# Patient Record
Sex: FEMALE | Race: BLACK OR AFRICAN AMERICAN | NOT HISPANIC OR LATINO | Employment: FULL TIME | URBAN - METROPOLITAN AREA
[De-identification: names, ages, dates, MRNs, and addresses within clinical notes are randomized per-mention and may not be internally consistent; named-entity substitution may affect disease eponyms.]

---

## 2018-09-14 ENCOUNTER — HOSPITAL ENCOUNTER (EMERGENCY)
Facility: HOSPITAL | Age: 33
Discharge: HOME | End: 2018-09-14
Attending: EMERGENCY MEDICINE
Payer: COMMERCIAL

## 2018-09-14 VITALS
BODY MASS INDEX: 37.24 KG/M2 | HEART RATE: 68 BPM | TEMPERATURE: 98 F | DIASTOLIC BLOOD PRESSURE: 88 MMHG | HEIGHT: 62 IN | RESPIRATION RATE: 17 BRPM | OXYGEN SATURATION: 98 % | SYSTOLIC BLOOD PRESSURE: 151 MMHG

## 2018-09-14 DIAGNOSIS — R10.9 ACUTE RIGHT FLANK PAIN: ICD-10-CM

## 2018-09-14 DIAGNOSIS — N20.1 URETERAL CALCULUS, RIGHT: Primary | ICD-10-CM

## 2018-09-14 LAB
ALBUMIN SERPL BCP-MCNC: 4.2 G/DL
ALP SERPL-CCNC: 76 U/L
ALT SERPL W/O P-5'-P-CCNC: 24 U/L
ANION GAP SERPL CALC-SCNC: 9 MMOL/L
AST SERPL-CCNC: 28 U/L
B-HCG UR QL: NEGATIVE
BASOPHILS # BLD AUTO: 0.04 K/UL
BASOPHILS NFR BLD: 0.4 %
BILIRUB SERPL-MCNC: 0.3 MG/DL
BILIRUB UR QL STRIP: NEGATIVE
BUN SERPL-MCNC: 15 MG/DL
CALCIUM SERPL-MCNC: 9.5 MG/DL
CHLORIDE SERPL-SCNC: 105 MMOL/L
CLARITY UR REFRACT.AUTO: ABNORMAL
CO2 SERPL-SCNC: 23 MMOL/L
COLOR UR AUTO: YELLOW
CREAT SERPL-MCNC: 1 MG/DL
CTP QC/QA: YES
DIFFERENTIAL METHOD: ABNORMAL
EOSINOPHIL # BLD AUTO: 0 K/UL
EOSINOPHIL NFR BLD: 0.3 %
ERYTHROCYTE [DISTWIDTH] IN BLOOD BY AUTOMATED COUNT: 13.3 %
EST. GFR  (AFRICAN AMERICAN): >60 ML/MIN/1.73 M^2
EST. GFR  (NON AFRICAN AMERICAN): >60 ML/MIN/1.73 M^2
GLUCOSE SERPL-MCNC: 118 MG/DL
GLUCOSE UR QL STRIP: NEGATIVE
HCT VFR BLD AUTO: 36.8 %
HGB BLD-MCNC: 12.7 G/DL
HGB UR QL STRIP: ABNORMAL
IMM GRANULOCYTES # BLD AUTO: 0.02 K/UL
IMM GRANULOCYTES NFR BLD AUTO: 0.2 %
KETONES UR QL STRIP: NEGATIVE
LEUKOCYTE ESTERASE UR QL STRIP: NEGATIVE
LYMPHOCYTES # BLD AUTO: 1.8 K/UL
LYMPHOCYTES NFR BLD: 19.6 %
MCH RBC QN AUTO: 31.7 PG
MCHC RBC AUTO-ENTMCNC: 34.5 G/DL
MCV RBC AUTO: 92 FL
MICROSCOPIC COMMENT: NORMAL
MONOCYTES # BLD AUTO: 0.5 K/UL
MONOCYTES NFR BLD: 5.7 %
NEUTROPHILS # BLD AUTO: 6.6 K/UL
NEUTROPHILS NFR BLD: 73.8 %
NITRITE UR QL STRIP: NEGATIVE
NRBC BLD-RTO: 0 /100 WBC
PH UR STRIP: 7 [PH] (ref 5–8)
PLATELET # BLD AUTO: 302 K/UL
PMV BLD AUTO: 11 FL
POTASSIUM SERPL-SCNC: 4.2 MMOL/L
PROT SERPL-MCNC: 7.6 G/DL
PROT UR QL STRIP: NEGATIVE
RBC # BLD AUTO: 4.01 M/UL
RBC #/AREA URNS AUTO: 2 /HPF (ref 0–4)
SODIUM SERPL-SCNC: 137 MMOL/L
SP GR UR STRIP: 1.02 (ref 1–1.03)
SQUAMOUS #/AREA URNS AUTO: 1 /HPF
URN SPEC COLLECT METH UR: ABNORMAL
UROBILINOGEN UR STRIP-ACNC: NEGATIVE EU/DL
WBC # BLD AUTO: 8.94 K/UL
WBC #/AREA URNS AUTO: 1 /HPF (ref 0–5)

## 2018-09-14 PROCEDURE — 80053 COMPREHEN METABOLIC PANEL: CPT

## 2018-09-14 PROCEDURE — 99284 EMERGENCY DEPT VISIT MOD MDM: CPT | Mod: 25

## 2018-09-14 PROCEDURE — 99283 EMERGENCY DEPT VISIT LOW MDM: CPT | Mod: ,,, | Performed by: EMERGENCY MEDICINE

## 2018-09-14 PROCEDURE — 96376 TX/PRO/DX INJ SAME DRUG ADON: CPT

## 2018-09-14 PROCEDURE — 85025 COMPLETE CBC W/AUTO DIFF WBC: CPT

## 2018-09-14 PROCEDURE — 81001 URINALYSIS AUTO W/SCOPE: CPT

## 2018-09-14 PROCEDURE — 81025 URINE PREGNANCY TEST: CPT | Performed by: EMERGENCY MEDICINE

## 2018-09-14 PROCEDURE — 96374 THER/PROPH/DIAG INJ IV PUSH: CPT

## 2018-09-14 PROCEDURE — 96375 TX/PRO/DX INJ NEW DRUG ADDON: CPT | Mod: 59

## 2018-09-14 PROCEDURE — 63600175 PHARM REV CODE 636 W HCPCS: Performed by: EMERGENCY MEDICINE

## 2018-09-14 PROCEDURE — 25000003 PHARM REV CODE 250: Performed by: EMERGENCY MEDICINE

## 2018-09-14 PROCEDURE — 96361 HYDRATE IV INFUSION ADD-ON: CPT

## 2018-09-14 RX ORDER — HYDROCODONE BITARTRATE AND ACETAMINOPHEN 5; 325 MG/1; MG/1
1 TABLET ORAL EVERY 6 HOURS PRN
Qty: 8 TABLET | Refills: 0 | Status: SHIPPED | OUTPATIENT
Start: 2018-09-14 | End: 2018-11-02

## 2018-09-14 RX ORDER — ONDANSETRON 4 MG/1
4 TABLET, FILM COATED ORAL EVERY 6 HOURS PRN
Qty: 12 TABLET | Refills: 0 | Status: SHIPPED | OUTPATIENT
Start: 2018-09-14 | End: 2019-12-13

## 2018-09-14 RX ORDER — MORPHINE SULFATE 4 MG/ML
4 INJECTION, SOLUTION INTRAMUSCULAR; INTRAVENOUS
Status: COMPLETED | OUTPATIENT
Start: 2018-09-14 | End: 2018-09-14

## 2018-09-14 RX ORDER — ONDANSETRON 2 MG/ML
4 INJECTION INTRAMUSCULAR; INTRAVENOUS
Status: COMPLETED | OUTPATIENT
Start: 2018-09-14 | End: 2018-09-14

## 2018-09-14 RX ORDER — KETOROLAC TROMETHAMINE 30 MG/ML
10 INJECTION, SOLUTION INTRAMUSCULAR; INTRAVENOUS
Status: COMPLETED | OUTPATIENT
Start: 2018-09-14 | End: 2018-09-14

## 2018-09-14 RX ADMIN — SODIUM CHLORIDE, SODIUM LACTATE, POTASSIUM CHLORIDE, AND CALCIUM CHLORIDE 1000 ML: .6; .31; .03; .02 INJECTION, SOLUTION INTRAVENOUS at 04:09

## 2018-09-14 RX ADMIN — MORPHINE SULFATE 4 MG: 4 INJECTION, SOLUTION INTRAMUSCULAR; INTRAVENOUS at 04:09

## 2018-09-14 RX ADMIN — ONDANSETRON 4 MG: 2 INJECTION, SOLUTION INTRAMUSCULAR; INTRAVENOUS at 04:09

## 2018-09-14 RX ADMIN — KETOROLAC TROMETHAMINE 10 MG: 30 INJECTION, SOLUTION INTRAMUSCULAR at 05:09

## 2018-09-14 RX ADMIN — MORPHINE SULFATE 4 MG: 4 INJECTION, SOLUTION INTRAMUSCULAR; INTRAVENOUS at 05:09

## 2018-09-14 NOTE — ED PROVIDER NOTES
Encounter Date: 9/14/2018    SCRIBE #1 NOTE: I, Hilario Leyva, am scribing for, and in the presence of,  Dr. Florez. I have scribed the following portions of the note - the Resident attestation.       History     Chief Complaint   Patient presents with    Flank Pain     pt complains of pain 9/10 radiating to right lower quadrant. pt is aox 4. pt denies chest pain, sob, but complains of nausea. pt denies hx of kidney stones. pt denies blood in urine or fever.      The history is provided by the patient and medical records.   Abdominal Pain   The current episode started 3 to 5 hours ago. The abdominal pain is located in the right flank. The abdominal pain radiates to the RLQ. The severity of the abdominal pain is 6/10. The abdominal pain is relieved by nothing. The other symptoms of the illness include nausea. The other symptoms of the illness do not include fever, vomiting, dysuria or vaginal bleeding.   Additional symptoms associated with the illness include diaphoresis. Symptoms associated with the illness do not include hematuria.      34 yo woman with no medical problems presents with flank pain, right, new, started at midnight, dull, intermittent, 8/10 at worst, 6/10 currently, radiates to RLQ, similar in nature but more severe than a pain experienced several weeks ago, unrelieved by naproxen 400 mg PO at home, associated with diaphoresis and nausea. LMP finished yesterday. Denies fever, vomiting, abdominal pain, dysuria, and hematuria. Denies recent exercise or injury.    Review of patient's allergies indicates:  No Known Allergies  Past Medical History:   Diagnosis Date    Vertigo      Past Surgical History:   Procedure Laterality Date    MYOMECTOMY  09/22/2015    MYOMECTOMY-OPEN N/A 9/22/2015    Performed by Stacie Bond MD at [facility] OR     Family History   Problem Relation Age of Onset    Diabetes Mother     Hypertension Mother     Diabetes Father     Hypertension Father     Prostate cancer  Father     Kidney cancer Father     Breast cancer Neg Hx     Colon cancer Neg Hx     Ovarian cancer Neg Hx     Heart disease Neg Hx      Social History     Tobacco Use    Smoking status: Never Smoker   Substance Use Topics    Alcohol use: No     Alcohol/week: 0.0 oz    Drug use: No     Review of Systems   Constitutional: Positive for diaphoresis. Negative for fever.   Gastrointestinal: Positive for abdominal pain and nausea. Negative for vomiting.   Genitourinary: Positive for flank pain. Negative for dysuria, hematuria and vaginal bleeding.       Physical Exam     Initial Vitals [09/14/18 0347]   BP Pulse Resp Temp SpO2   (!) 193/95 87 20 98.5 °F (36.9 °C) 100 %      MAP       --         Physical Exam    Nursing note and vitals reviewed.  Constitutional: She appears well-developed and well-nourished. She is diaphoretic. No distress.   HENT:   Head: Normocephalic and atraumatic.   Right Ear: External ear normal.   Left Ear: External ear normal.   Nose: Nose normal.   Mouth/Throat: Oropharynx is clear and moist.   Eyes: Conjunctivae are normal. Pupils are equal, round, and reactive to light. Right eye exhibits no discharge. Left eye exhibits no discharge. No scleral icterus.   Neck: Neck supple. No thyromegaly present. No tracheal deviation present. No JVD present.   Cardiovascular: Normal rate, regular rhythm, normal heart sounds and intact distal pulses. Exam reveals no gallop and no friction rub.    No murmur heard.  Pulmonary/Chest: Breath sounds normal. No stridor. No respiratory distress. She has no wheezes. She has no rhonchi. She has no rales. She exhibits no tenderness.   Abdominal: Soft. Bowel sounds are normal. She exhibits no distension. There is no tenderness. There is no rigidity, no rebound, no guarding, no CVA tenderness, no tenderness at McBurney's point and negative De Luna's sign.   Musculoskeletal: Normal range of motion. She exhibits no edema.   Lymphadenopathy:     She has no cervical  adenopathy.   Neurological: She is alert and oriented to person, place, and time.   Skin: Skin is warm.   Psychiatric: She has a normal mood and affect. Her behavior is normal.         ED Course   Procedures  Labs Reviewed   CBC W/ AUTO DIFFERENTIAL   COMPREHENSIVE METABOLIC PANEL   URINALYSIS, REFLEX TO URINE CULTURE   POCT URINE PREGNANCY          Imaging Results    None          Medical Decision Making:   History:   Old Medical Records: I decided to obtain old medical records.  Clinical Tests:   Lab Tests: Ordered and Reviewed  Radiological Study: Ordered and Reviewed       APC / Resident Notes:   HO-IV MDM  Ddx: nephrolithiasis, pyelonephritis, muscle strain, hydronephrosis; considered cholecystitis and appendicitis but no abdominal tenderness.    Flank pain work-up including CT renal stone study. 1 L IVF, Zofran 4 mg IV, and morphine 4 mg IV.    Jean Carlos Eaton, PGY4 4:16 AM 09/14/2018       CT renal stone:  1. Mild right hydroureteronephrosis with surrounding perinephric stranding.  Calcification within the right pelvis near the expected course of the ureter measuring up to 0.5 cm likely represent distal ureteral stone.  2. Intrauterine device in place.    UA without signs of infection, CBC and CMP unremarkable with normal creatinine.    Upon reassessment, patient's pain has improved. Discharge with [facility], Zofran, urology, and PCP f/u with return precautions given. Patient has naproxen at home.    Jean Carlos Eaton, PGY4 5:38 AM 09/14/2018      Scribe Attestation:   Scribe #1: I performed the above scribed service and the documentation accurately describes the services I performed. I attest to the accuracy of the note.    Attending Attestation:   Physician Attestation Statement for Resident:  As the supervising MD   Physician Attestation Statement: I have personally seen and examined this patient.   I agree with the above history. -:   As the supervising MD I agree with the above PE.    As the supervising  MD I agree with the above treatment, course, plan, and disposition.  I have reviewed and agree with the residents interpretation of the following: lab data and CT scans.                       Clinical Impression:   The encounter diagnosis was Acute right flank pain.      Disposition:   Disposition: Discharged  Condition: Stable                        Jean Carlos Eaton MD  Resident  09/14/18 0538

## 2018-09-14 NOTE — ED NOTES
Kady Arredondo Vishnu, an 33 y.o. female presents to the ED c/o right flank pain that radiates to her groin . Denies heavy lifting and urinary s/s. C/o nausea.       Chief Complaint   Patient presents with    Flank Pain     pt complains of pain 9/10 radiating to right lower quadrant. pt is aox 4. pt denies chest pain, sob, but complains of nausea. pt denies hx of kidney stones. pt denies blood in urine or fever.      Review of patient's allergies indicates:  No Known Allergies  Past Medical History:   Diagnosis Date    Vertigo

## 2018-11-02 ENCOUNTER — LAB VISIT (OUTPATIENT)
Dept: LAB | Facility: HOSPITAL | Age: 33
End: 2018-11-02
Attending: INTERNAL MEDICINE
Payer: COMMERCIAL

## 2018-11-02 ENCOUNTER — TELEPHONE (OUTPATIENT)
Dept: OBSTETRICS AND GYNECOLOGY | Facility: CLINIC | Age: 33
End: 2018-11-02

## 2018-11-02 ENCOUNTER — OFFICE VISIT (OUTPATIENT)
Dept: INTERNAL MEDICINE | Facility: CLINIC | Age: 33
End: 2018-11-02
Payer: COMMERCIAL

## 2018-11-02 VITALS
RESPIRATION RATE: 14 BRPM | SYSTOLIC BLOOD PRESSURE: 118 MMHG | DIASTOLIC BLOOD PRESSURE: 74 MMHG | TEMPERATURE: 98 F | HEART RATE: 84 BPM | WEIGHT: 212.94 LBS | BODY MASS INDEX: 38.95 KG/M2

## 2018-11-02 DIAGNOSIS — Z00.00 ANNUAL PHYSICAL EXAM: Primary | ICD-10-CM

## 2018-11-02 DIAGNOSIS — E55.9 VITAMIN D DEFICIENCY: Primary | ICD-10-CM

## 2018-11-02 DIAGNOSIS — F32.A MILD DEPRESSION: ICD-10-CM

## 2018-11-02 DIAGNOSIS — Z86.2 HISTORY OF ANEMIA: ICD-10-CM

## 2018-11-02 DIAGNOSIS — Z00.00 ANNUAL PHYSICAL EXAM: ICD-10-CM

## 2018-11-02 DIAGNOSIS — E66.9 OBESITY (BMI 35.0-39.9 WITHOUT COMORBIDITY): ICD-10-CM

## 2018-11-02 DIAGNOSIS — F41.9 ANXIETY: ICD-10-CM

## 2018-11-02 PROBLEM — N20.0 KIDNEY STONE: Status: ACTIVE | Noted: 2018-09-14

## 2018-11-02 LAB
25(OH)D3+25(OH)D2 SERPL-MCNC: 6 NG/ML
ALBUMIN SERPL BCP-MCNC: 3.9 G/DL
ALP SERPL-CCNC: 70 U/L
ALT SERPL W/O P-5'-P-CCNC: 21 U/L
ANION GAP SERPL CALC-SCNC: 7 MMOL/L
AST SERPL-CCNC: 20 U/L
BASOPHILS # BLD AUTO: 0.05 K/UL
BASOPHILS NFR BLD: 0.8 %
BILIRUB SERPL-MCNC: 0.3 MG/DL
BUN SERPL-MCNC: 10 MG/DL
CALCIUM SERPL-MCNC: 9.9 MG/DL
CHLORIDE SERPL-SCNC: 104 MMOL/L
CHOLEST SERPL-MCNC: 197 MG/DL
CHOLEST/HDLC SERPL: 3.7 {RATIO}
CO2 SERPL-SCNC: 27 MMOL/L
CREAT SERPL-MCNC: 0.8 MG/DL
DIFFERENTIAL METHOD: NORMAL
EOSINOPHIL # BLD AUTO: 0.1 K/UL
EOSINOPHIL NFR BLD: 0.9 %
ERYTHROCYTE [DISTWIDTH] IN BLOOD BY AUTOMATED COUNT: 13.3 %
EST. GFR  (AFRICAN AMERICAN): >60 ML/MIN/1.73 M^2
EST. GFR  (NON AFRICAN AMERICAN): >60 ML/MIN/1.73 M^2
ESTIMATED AVG GLUCOSE: 111 MG/DL
FERRITIN SERPL-MCNC: 30 NG/ML
FOLATE SERPL-MCNC: 13.3 NG/ML
GLUCOSE SERPL-MCNC: 98 MG/DL
HBA1C MFR BLD HPLC: 5.5 %
HCT VFR BLD AUTO: 37.9 %
HDLC SERPL-MCNC: 53 MG/DL
HDLC SERPL: 26.9 %
HGB BLD-MCNC: 12.4 G/DL
IMM GRANULOCYTES # BLD AUTO: 0.02 K/UL
IMM GRANULOCYTES NFR BLD AUTO: 0.3 %
IRON SERPL-MCNC: 50 UG/DL
LDLC SERPL CALC-MCNC: 128.4 MG/DL
LYMPHOCYTES # BLD AUTO: 2.5 K/UL
LYMPHOCYTES NFR BLD: 38.5 %
MCH RBC QN AUTO: 30.4 PG
MCHC RBC AUTO-ENTMCNC: 32.7 G/DL
MCV RBC AUTO: 93 FL
MONOCYTES # BLD AUTO: 0.4 K/UL
MONOCYTES NFR BLD: 5.7 %
NEUTROPHILS # BLD AUTO: 3.5 K/UL
NEUTROPHILS NFR BLD: 53.8 %
NONHDLC SERPL-MCNC: 144 MG/DL
NRBC BLD-RTO: 0 /100 WBC
PLATELET # BLD AUTO: 319 K/UL
PMV BLD AUTO: 11.1 FL
POTASSIUM SERPL-SCNC: 4 MMOL/L
PROT SERPL-MCNC: 7.5 G/DL
RBC # BLD AUTO: 4.08 M/UL
SATURATED IRON: 13 %
SODIUM SERPL-SCNC: 138 MMOL/L
TOTAL IRON BINDING CAPACITY: 395 UG/DL
TRANSFERRIN SERPL-MCNC: 267 MG/DL
TRIGL SERPL-MCNC: 78 MG/DL
TSH SERPL DL<=0.005 MIU/L-ACNC: 0.95 UIU/ML
VIT B12 SERPL-MCNC: 1134 PG/ML
WBC # BLD AUTO: 6.5 K/UL

## 2018-11-02 PROCEDURE — 84443 ASSAY THYROID STIM HORMONE: CPT

## 2018-11-02 PROCEDURE — 82306 VITAMIN D 25 HYDROXY: CPT

## 2018-11-02 PROCEDURE — 83036 HEMOGLOBIN GLYCOSYLATED A1C: CPT

## 2018-11-02 PROCEDURE — 99395 PREV VISIT EST AGE 18-39: CPT | Mod: S$GLB,,, | Performed by: INTERNAL MEDICINE

## 2018-11-02 PROCEDURE — 82728 ASSAY OF FERRITIN: CPT

## 2018-11-02 PROCEDURE — 80061 LIPID PANEL: CPT

## 2018-11-02 PROCEDURE — 85025 COMPLETE CBC W/AUTO DIFF WBC: CPT

## 2018-11-02 PROCEDURE — 82746 ASSAY OF FOLIC ACID SERUM: CPT

## 2018-11-02 PROCEDURE — 80053 COMPREHEN METABOLIC PANEL: CPT

## 2018-11-02 PROCEDURE — 82607 VITAMIN B-12: CPT

## 2018-11-02 PROCEDURE — 99999 PR PBB SHADOW E&M-EST. PATIENT-LVL III: CPT | Mod: PBBFAC,,, | Performed by: INTERNAL MEDICINE

## 2018-11-02 PROCEDURE — 36415 COLL VENOUS BLD VENIPUNCTURE: CPT | Mod: PO

## 2018-11-02 PROCEDURE — 83540 ASSAY OF IRON: CPT

## 2018-11-02 RX ORDER — ERGOCALCIFEROL 1.25 MG/1
50000 CAPSULE ORAL
Qty: 4 CAPSULE | Refills: 2 | Status: SHIPPED | OUTPATIENT
Start: 2018-11-02 | End: 2019-01-25 | Stop reason: ALTCHOICE

## 2018-11-02 NOTE — PROGRESS NOTES
Subjective:      Kady Mares is a 33 y.o. female who presents for annual exam.    Family History:  family history includes Diabetes in her father and mother; Hypertension in her father and mother; Kidney cancer in her father; Prostate cancer in her father.    Health Maintenance:  Health Maintenance       Date Due Completion Date    TETANUS VACCINE 02/28/2003 ---    Pap Smear with HPV Cotest 07/30/2018 7/30/2015    Influenza Vaccine 08/01/2018 ---        Eye exam: not recently  Dental Exam: sees yearly  OB/GYN: Dr. Bond    Last Pap: 9/2015    Influenza: will receive @ work, [employer]  Tetanus: in last 10 years    Exercise: minimal  Diet: previously vegetarian, now off and eats chicken and fish, some meat substitutes  Body mass index is 38.95 kg/m².    Meds:   Current Outpatient Medications:     meclizine (ANTIVERT) 50 MG tablet, Take 50 mg by mouth as needed. , Disp: , Rfl:     ondansetron (ZOFRAN) 4 MG tablet, Take 1 tablet (4 mg total) by mouth every 6 (six) hours as needed for Nausea., Disp: 12 tablet, Rfl: 0    PMHx:   Past Medical History:   Diagnosis Date    Nephrolithiasis     Vertigo        PSHx:     Past Surgical History:   Procedure Laterality Date    MYOMECTOMY  09/22/2015    MYOMECTOMY-OPEN N/A 9/22/2015    [facility] at [facility]       [facility]   Social History     Socioeconomic History    Marital status:      Spouse name: None    Number of children: None    Years of education: None    Highest education level: None   Social Needs    Financial resource strain: None    Food insecurity - worry: None    Food insecurity - inability: None    Transportation needs - medical: None    Transportation needs - non-medical: None   Occupational History    None   Tobacco Use    Smoking status: Never Smoker   Substance and Sexual Activity    Alcohol use: No     Alcohol/week: 0.0 oz    Drug use: No    Sexual activity: Not Currently   Other Topics Concern     None   Social History Narrative    None       Review of Systems   Constitutional: Positive for unexpected weight change. Negative for activity change, chills, diaphoresis and fever.   HENT: Negative for congestion, ear discharge, ear pain, hearing loss, rhinorrhea, sinus pressure and trouble swallowing.    Eyes: Negative for discharge, itching and visual disturbance.   Respiratory: Negative for cough, chest tightness, shortness of breath and wheezing.    Cardiovascular: Positive for chest pain (associated with anxiety with racing heart, possibly stress-related). Negative for palpitations and leg swelling.   Gastrointestinal: Positive for vomiting (in association with vertigo). Negative for abdominal pain, blood in stool, constipation and diarrhea.   Endocrine: Positive for heat intolerance. Negative for cold intolerance, polydipsia and polyuria.   Genitourinary: Negative for difficulty urinating, dysuria, frequency, hematuria, menstrual problem and urgency.   Musculoskeletal: Positive for arthralgias (wrists and old ankle sprain). Negative for joint swelling and neck pain.   Skin: Negative for rash.   Neurological: Positive for headaches. Negative for dizziness, weakness and numbness.   Hematological: Negative for adenopathy.   Psychiatric/Behavioral: Positive for dysphoric mood. Negative for confusion. The patient is nervous/anxious.          Answers for HPI/ROS submitted by the patient on 10/30/2018   activity change: No  unexpected weight change: Yes  neck pain: No  hearing loss: No  rhinorrhea: No  trouble swallowing: No  eye discharge: No  visual disturbance: No  chest tightness: No  wheezing: No  chest pain: Yes  palpitations: No  blood in stool: No  constipation: No  vomiting: Yes  diarrhea: No  polydipsia: No  polyuria: No  difficulty urinating: No  hematuria: No  menstrual problem: No  dysuria: No  joint swelling: No  arthralgias: Yes  headaches: Yes  weakness: No  confusion: No  dysphoric mood:  Yes        Objective:      Physical Exam   Constitutional: She is oriented to person, place, and time. Vital signs are normal. She appears well-developed and well-nourished. No distress.   HENT:   Head: Normocephalic and atraumatic.   Right Ear: Hearing, tympanic membrane, external ear and ear canal normal. Tympanic membrane is not erythematous and not bulging.   Left Ear: Hearing, tympanic membrane, external ear and ear canal normal. Tympanic membrane is not erythematous and not bulging.   Nose: Nose normal.   Mouth/Throat: Uvula is midline, oropharynx is clear and moist and mucous membranes are normal. No oropharyngeal exudate or posterior oropharyngeal erythema.   Eyes: Conjunctivae, EOM and lids are normal. Pupils are equal, round, and reactive to light. No scleral icterus.   Neck: Normal range of motion. Neck supple. No thyroid mass and no thyromegaly present.   Cardiovascular: Normal rate, regular rhythm, normal heart sounds and intact distal pulses.   No murmur heard.  Pulmonary/Chest: Effort normal and breath sounds normal. She has no wheezes.   Abdominal: Soft. Bowel sounds are normal. She exhibits no distension. There is no hepatosplenomegaly. There is no tenderness. There is no rigidity, no rebound and no guarding.   Musculoskeletal: Normal range of motion. She exhibits no edema.   Lymphadenopathy:     She has no cervical adenopathy.        Right: No supraclavicular adenopathy present.        Left: No supraclavicular adenopathy present.   Neurological: She is alert and oriented to person, place, and time. She has normal reflexes. Coordination and gait normal.   Skin: Skin is warm, dry and intact. No rash noted. She is not diaphoretic.   Psychiatric: She has a normal mood and affect.   Vitals reviewed.      Assessment:       1. Annual physical exam    2. Anxiety    3. Mild depression    4. Obesity (BMI 35.0-39.9 without comorbidity)    5. History of anemia        Plan:     1. Annual physical exam  -  Ambulatory referral to Obstetrics / Gynecology  - CBC auto differential; Future  - Comprehensive metabolic panel; Future  - Hemoglobin A1c; Future  - Lipid panel; Future  - TSH; Future  - Urinalysis; Future  - Vitamin D; Future    2. Anxiety  - mild, symptoms present  - patient plans to re-establish care with Psychology    3. Mild depression  - discussed trial of treatment with patient and she will consider    4. Obesity (BMI 35.0-39.9 without comorbidity)  - plans to increase physical activity    5. History of anemia  - Ferritin; Future  - Iron and TIBC; Future  - Folate; Future  - Vitamin B12; Future    RTC in 1 year or sooner if needed    Diamond Vasquez MD

## 2018-11-02 NOTE — TELEPHONE ENCOUNTER
----- Message from Melissa Gallegos sent at 11/2/2018  8:46 AM CDT -----  Contact: Pt [phone]  Pt would like a call back from the nurse to see if she can be fit in for her annual. Pt is added to the wait list schedule for Dr Bond seems to not be open pt would like to know when it would be open. Please advise pt.   She states she can come any Thursday afternoon or any Fridays

## 2018-11-02 NOTE — TELEPHONE ENCOUNTER
----- Message from Saranya Hamm sent at 11/2/2018 10:48 AM CDT -----  Contact: Pt   Name of Who is Calling: PILAR SEYMOUR [[id number]]    What is the request in detail: Pt is returning Meghna's call, please advise.    Can the clinic reply by [facility]: No     [facility] [phone]

## 2018-11-02 NOTE — TELEPHONE ENCOUNTER
Hello, this is Meghna calling from the OBGYN clinic at [facility]. Calling to offer the pt an appointment on 12/07 at 1PM for her WWE. (No answer, left VM message for this pt regarding this information and to call the clinic back to confirm.)

## 2018-11-04 ENCOUNTER — PATIENT MESSAGE (OUTPATIENT)
Dept: INTERNAL MEDICINE | Facility: CLINIC | Age: 33
End: 2018-11-04

## 2018-11-05 NOTE — TELEPHONE ENCOUNTER
Lexapro tends to be well-tolerated. Will start Lexapro 5mg daily. May increase dose if no improvement in 3 weeks.    Emailed patient, will enter Lexapro after she is notified.

## 2018-11-06 RX ORDER — ESCITALOPRAM OXALATE 5 MG/1
5 TABLET ORAL DAILY
Qty: 30 TABLET | Refills: 0 | Status: SHIPPED | OUTPATIENT
Start: 2018-11-06 | End: 2018-12-31 | Stop reason: SDUPTHER

## 2018-12-07 ENCOUNTER — OFFICE VISIT (OUTPATIENT)
Dept: OBSTETRICS AND GYNECOLOGY | Facility: CLINIC | Age: 33
End: 2018-12-07
Attending: OBSTETRICS & GYNECOLOGY
Payer: COMMERCIAL

## 2018-12-07 VITALS
WEIGHT: 212.31 LBS | DIASTOLIC BLOOD PRESSURE: 90 MMHG | SYSTOLIC BLOOD PRESSURE: 144 MMHG | BODY MASS INDEX: 39.07 KG/M2 | HEIGHT: 62 IN

## 2018-12-07 DIAGNOSIS — Z01.419 VISIT FOR GYNECOLOGIC EXAMINATION: Primary | ICD-10-CM

## 2018-12-07 PROCEDURE — 88175 CYTOPATH C/V AUTO FLUID REDO: CPT | Performed by: PATHOLOGY

## 2018-12-07 PROCEDURE — 99999 PR PBB SHADOW E&M-EST. PATIENT-LVL III: CPT | Mod: PBBFAC,,, | Performed by: OBSTETRICS & GYNECOLOGY

## 2018-12-07 PROCEDURE — 87624 HPV HI-RISK TYP POOLED RSLT: CPT

## 2018-12-07 PROCEDURE — 99385 PREV VISIT NEW AGE 18-39: CPT | Mod: S$GLB,,, | Performed by: OBSTETRICS & GYNECOLOGY

## 2018-12-07 PROCEDURE — 88141 CYTOPATH C/V INTERPRET: CPT | Mod: ,,, | Performed by: PATHOLOGY

## 2018-12-07 NOTE — PROGRESS NOTES
"CC: Well woman exam    Kady Mares is a 33 y.o. female  presents for a well woman exam.  She has no issues, problems, or complaints.    Past Medical History:   Diagnosis Date    Nephrolithiasis     Vertigo        Past Surgical History:   Procedure Laterality Date    MYOMECTOMY  2015    MYOMECTOMY-OPEN N/A 2015    Performed by Stacie Bond MD at [facility] OR       OB History    Para Term  AB Living   0 0 0 0 0 0   SAB TAB Ectopic Multiple Live Births   0 0 0 0               Family History   Problem Relation Age of Onset    Diabetes Mother     Hypertension Mother     Diabetes Father     Hypertension Father     Prostate cancer Father     Kidney cancer Father     Breast cancer Neg Hx     Colon cancer Neg Hx     Ovarian cancer Neg Hx     Heart disease Neg Hx        Social History     Tobacco Use    Smoking status: Never Smoker    Smokeless tobacco: Never Used   Substance Use Topics    Alcohol use: No     Alcohol/week: 0.0 oz    Drug use: No       BP (!) 144/90 (BP Location: Left arm, Patient Position: Sitting, BP Method: Small (Manual))   Ht 5' 2" (1.575 m)   Wt 96.3 kg (212 lb 4.9 oz)   LMP 2018 (Exact Date)   BMI 38.83 kg/m²     ROS:  GENERAL: Denies weight gain or weight loss. Feeling well overall.   SKIN: Denies rash or lesions.   HEAD: Denies head injury or headache.   NODES: Denies enlarged lymph nodes.   CHEST: Denies chest pain or shortness of breath.   CARDIOVASCULAR: Denies palpitations or left sided chest pain.   ABDOMEN: No abdominal pain, constipation, diarrhea, nausea, vomiting or rectal bleeding.   URINARY: No frequency, dysuria, hematuria, or burning on urination.  REPRODUCTIVE: See HPI.   BREASTS: The patient performs breast self-examination and denies pain, lumps, or nipple discharge.   HEMATOLOGIC: No easy bruisability or excessive bleeding.  MUSCULOSKELETAL: Denies joint pain or swelling.   NEUROLOGIC: Denies syncope or " weakness.   PSYCHIATRIC: Denies depression, anxiety or mood swings.    Physical Exam:    APPEARANCE: Well nourished, well developed, in no acute distress.  AFFECT: WNL, alert and oriented x 3  SKIN: No acne or hirsutism  NECK: Neck symmetric without masses or thyromegaly  NODES: No inguinal, cervical, axillary, or femoral lymph node enlargement  CHEST: Good respiratory effect  ABDOMEN: Soft.  No tenderness or masses.  No hepatosplenomegaly.  No hernias.  BREASTS: Symmetrical, no skin changes or visible lesions.  No palpable masses, nipple discharge bilaterally.  PELVIC: Normal external genitalia without lesions.  Normal hair distribution.  Adequate perineal body, normal urethral meatus.  Vagina moist and well rugated without lesions or discharge.  Cervix pink, without lesions, discharge or tenderness.  IUD strings are visible and palpable.  No significant cystocele or rectocele.  Bimanual exam shows uterus to be normal size, regular, mobile and nontender.  Adnexa without masses or tenderness.    EXTREMITIES: No edema.    ASSESSMENT AND PLAN  1. Visit for gynecologic examination  Liquid-based pap smear, screening    HPV High Risk Genotypes, PCR       Patient was counseled today on A.C.S. Pap guidelines and recommendations for yearly pelvic exams, pap smears every 5 years with HPV co-testing, and monthly self breast exams; to see her PCP for other health maintenance.     Follow-up in about 1 year (around 12/7/2019).

## 2018-12-13 LAB
HPV HR 12 DNA CVX QL NAA+PROBE: NEGATIVE
HPV16 AG SPEC QL: NEGATIVE
HPV18 DNA SPEC QL NAA+PROBE: NEGATIVE

## 2018-12-16 ENCOUNTER — PATIENT MESSAGE (OUTPATIENT)
Dept: INTERNAL MEDICINE | Facility: CLINIC | Age: 33
End: 2018-12-16

## 2018-12-31 ENCOUNTER — PATIENT MESSAGE (OUTPATIENT)
Dept: INTERNAL MEDICINE | Facility: CLINIC | Age: 33
End: 2018-12-31

## 2018-12-31 RX ORDER — ESCITALOPRAM OXALATE 10 MG/1
10 TABLET ORAL DAILY
Qty: 30 TABLET | Refills: 0 | Status: SHIPPED | OUTPATIENT
Start: 2018-12-31 | End: 2019-01-25 | Stop reason: SDUPTHER

## 2018-12-31 NOTE — TELEPHONE ENCOUNTER
Will increase Lexapro to 10mg daily.     And Lexapro can be associated with muscle spasms and twitching but it is rare. Monitor for worsening eye twitching.

## 2019-01-02 ENCOUNTER — PATIENT MESSAGE (OUTPATIENT)
Dept: INTERNAL MEDICINE | Facility: CLINIC | Age: 34
End: 2019-01-02

## 2019-01-14 ENCOUNTER — PATIENT MESSAGE (OUTPATIENT)
Dept: INTERNAL MEDICINE | Facility: CLINIC | Age: 34
End: 2019-01-14

## 2019-01-14 NOTE — TELEPHONE ENCOUNTER
Pt informed via portal that a follow up appt is necessary and to make sure she brings all paperwork and forms with her at time of appointment.

## 2019-01-14 NOTE — TELEPHONE ENCOUNTER
Patient seen once 11/2/18.     Will need a follow-up visit for anxiety/depression. She would need to bring any details or forms she may need to be completed.

## 2019-01-20 ENCOUNTER — PATIENT MESSAGE (OUTPATIENT)
Dept: INTERNAL MEDICINE | Facility: CLINIC | Age: 34
End: 2019-01-20

## 2019-01-25 ENCOUNTER — OFFICE VISIT (OUTPATIENT)
Dept: INTERNAL MEDICINE | Facility: CLINIC | Age: 34
End: 2019-01-25
Payer: COMMERCIAL

## 2019-01-25 VITALS
WEIGHT: 211.88 LBS | TEMPERATURE: 99 F | DIASTOLIC BLOOD PRESSURE: 86 MMHG | BODY MASS INDEX: 38.99 KG/M2 | SYSTOLIC BLOOD PRESSURE: 128 MMHG | HEIGHT: 62 IN | RESPIRATION RATE: 16 BRPM | HEART RATE: 68 BPM

## 2019-01-25 DIAGNOSIS — F41.9 ANXIETY AND DEPRESSION: Primary | ICD-10-CM

## 2019-01-25 DIAGNOSIS — F32.A ANXIETY AND DEPRESSION: Primary | ICD-10-CM

## 2019-01-25 PROCEDURE — 3008F PR BODY MASS INDEX (BMI) DOCUMENTED: ICD-10-PCS | Mod: CPTII,S$GLB,, | Performed by: INTERNAL MEDICINE

## 2019-01-25 PROCEDURE — 99999 PR PBB SHADOW E&M-EST. PATIENT-LVL III: CPT | Mod: PBBFAC,,, | Performed by: INTERNAL MEDICINE

## 2019-01-25 PROCEDURE — 3008F BODY MASS INDEX DOCD: CPT | Mod: CPTII,S$GLB,, | Performed by: INTERNAL MEDICINE

## 2019-01-25 PROCEDURE — 99213 OFFICE O/P EST LOW 20 MIN: CPT | Mod: S$GLB,,, | Performed by: INTERNAL MEDICINE

## 2019-01-25 PROCEDURE — 99999 PR PBB SHADOW E&M-EST. PATIENT-LVL III: ICD-10-PCS | Mod: PBBFAC,,, | Performed by: INTERNAL MEDICINE

## 2019-01-25 PROCEDURE — 99213 PR OFFICE/OUTPT VISIT, EST, LEVL III, 20-29 MIN: ICD-10-PCS | Mod: S$GLB,,, | Performed by: INTERNAL MEDICINE

## 2019-01-25 RX ORDER — ESCITALOPRAM OXALATE 20 MG/1
20 TABLET ORAL DAILY
Qty: 30 TABLET | Refills: 0 | Status: SHIPPED | OUTPATIENT
Start: 2019-01-25 | End: 2019-02-25 | Stop reason: SDUPTHER

## 2019-01-25 NOTE — PROGRESS NOTES
Subjective:       Patient ID: Kady Mares is a 33 y.o. female who presents for Follow-up (fill out forms) and Anxiety      Anxiety   Presents for follow-up visit. Symptoms include depressed mood, dizziness (improved after getting adjusted to Lexapro), excessive worry, irritability, nausea (due to Lexapro, improving), nervous/anxious behavior and restlessness. Patient reports no chest pain, confusion, hyperventilation, palpitations or shortness of breath. Symptoms occur constantly. The severity of symptoms is moderate. The quality of sleep is fair.     Compliance with medications is %. Side effects of treatment include GI discomfort.      Patient was evaluated by Araseli Dunn [facility] for anxiety and depression and she will see the counselor every 2 weeks. 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       Review of Systems   Constitutional: Positive for irritability. Negative for activity change, chills, fever and unexpected weight change.   HENT: Negative for congestion, hearing loss, postnasal drip, rhinorrhea, sore throat and trouble swallowing.    Eyes: Negative for discharge, redness and visual disturbance.   Respiratory: Negative for cough, chest tightness, shortness of breath and wheezing.    Cardiovascular: Negative for chest pain and palpitations.   Gastrointestinal: Positive for nausea (due to Lexapro, improving). Negative for abdominal pain, blood in stool, constipation, diarrhea and vomiting.   Endocrine: Negative for polydipsia and polyuria.   Genitourinary: Negative for difficulty urinating, dysuria, hematuria and menstrual problem.   Musculoskeletal: Negative for arthralgias, joint swelling and neck pain.   Skin: Negative for rash.   Neurological: Positive for dizziness (improved after getting adjusted to Lexapro). Negative for weakness and headaches.   Psychiatric/Behavioral: Positive for  dysphoric mood. Negative for confusion. The patient is nervous/anxious.          Answers for HPI/ROS submitted by the patient on 1/24/2019   activity change: No  unexpected weight change: No  neck pain: No  hearing loss: No  rhinorrhea: No  trouble swallowing: No  eye discharge: No  visual disturbance: No  chest tightness: No  wheezing: No  chest pain: Yes  palpitations: No  blood in stool: No  constipation: No  vomiting: No  diarrhea: No  polydipsia: No  polyuria: No  difficulty urinating: No  hematuria: No  menstrual problem: No  dysuria: No  joint swelling: No  arthralgias: No  headaches: No  weakness: No  confusion: No  dysphoric mood: Yes      Objective:      Physical Exam   Constitutional: She is oriented to person, place, and time. Vital signs are normal. She appears well-developed and well-nourished. No distress.   HENT:   Head: Normocephalic and atraumatic.   Right Ear: Hearing and external ear normal.   Left Ear: Hearing and external ear normal.   Nose: Nose normal.   Mouth/Throat: Uvula is midline.   Eyes: Lids are normal.   Cardiovascular: Normal rate, regular rhythm, normal heart sounds and intact distal pulses.   No murmur heard.  Pulmonary/Chest: Effort normal and breath sounds normal. She has no wheezes.   Abdominal: Soft. Bowel sounds are normal. She exhibits no distension. There is no tenderness.   Musculoskeletal: Normal range of motion. She exhibits no edema.   Neurological: She is alert and oriented to person, place, and time.   Skin: Skin is warm, dry and intact. No rash noted. She is not diaphoretic.   Psychiatric: She has a normal mood and affect.   Vitals reviewed.      Assessment/Plan:         1. Anxiety and depression  - increase Lexapro   - escitalopram oxalate (LEXAPRO) 20 MG tablet; Take 1 tablet (20 mg total) by mouth once daily.  Dispense: 30 tablet; Refill: 0  - continue working with therapist  - will complete letter for special testing accommodations    RTC in 3 months or sooner if  needed    Diamond Vasquez MD

## 2019-01-28 ENCOUNTER — TELEPHONE (OUTPATIENT)
Dept: INTERNAL MEDICINE | Facility: CLINIC | Age: 34
End: 2019-01-28

## 2019-02-25 DIAGNOSIS — F41.9 ANXIETY AND DEPRESSION: ICD-10-CM

## 2019-02-25 DIAGNOSIS — F32.A ANXIETY AND DEPRESSION: ICD-10-CM

## 2019-02-25 RX ORDER — ESCITALOPRAM OXALATE 20 MG/1
20 TABLET ORAL DAILY
Qty: 30 TABLET | Refills: 2 | Status: SHIPPED | OUTPATIENT
Start: 2019-02-25 | End: 2019-05-29 | Stop reason: SDUPTHER

## 2019-05-29 DIAGNOSIS — F32.A ANXIETY AND DEPRESSION: ICD-10-CM

## 2019-05-29 DIAGNOSIS — F41.9 ANXIETY AND DEPRESSION: ICD-10-CM

## 2019-05-29 RX ORDER — ESCITALOPRAM OXALATE 20 MG/1
20 TABLET ORAL DAILY
Qty: 30 TABLET | Refills: 2 | Status: SHIPPED | OUTPATIENT
Start: 2019-05-29 | End: 2019-12-13

## 2019-12-13 ENCOUNTER — LAB VISIT (OUTPATIENT)
Dept: LAB | Facility: HOSPITAL | Age: 34
End: 2019-12-13
Attending: INTERNAL MEDICINE
Payer: COMMERCIAL

## 2019-12-13 ENCOUNTER — OFFICE VISIT (OUTPATIENT)
Dept: INTERNAL MEDICINE | Facility: CLINIC | Age: 34
End: 2019-12-13
Payer: COMMERCIAL

## 2019-12-13 VITALS
BODY MASS INDEX: 41.26 KG/M2 | HEART RATE: 78 BPM | TEMPERATURE: 99 F | HEIGHT: 62 IN | SYSTOLIC BLOOD PRESSURE: 132 MMHG | RESPIRATION RATE: 20 BRPM | WEIGHT: 224.19 LBS | DIASTOLIC BLOOD PRESSURE: 80 MMHG

## 2019-12-13 DIAGNOSIS — E66.01 MORBID OBESITY WITH BMI OF 40.0-44.9, ADULT: ICD-10-CM

## 2019-12-13 DIAGNOSIS — F32.A MILD DEPRESSION: ICD-10-CM

## 2019-12-13 DIAGNOSIS — Z00.00 ANNUAL PHYSICAL EXAM: Primary | ICD-10-CM

## 2019-12-13 DIAGNOSIS — F41.9 ANXIETY: ICD-10-CM

## 2019-12-13 DIAGNOSIS — E55.9 VITAMIN D DEFICIENCY: ICD-10-CM

## 2019-12-13 DIAGNOSIS — Z00.00 ANNUAL PHYSICAL EXAM: ICD-10-CM

## 2019-12-13 LAB
25(OH)D3+25(OH)D2 SERPL-MCNC: 21 NG/ML (ref 30–96)
ALBUMIN SERPL BCP-MCNC: 3.9 G/DL (ref 3.5–5.2)
ALP SERPL-CCNC: 77 U/L (ref 55–135)
ALT SERPL W/O P-5'-P-CCNC: 20 U/L (ref 10–44)
ANION GAP SERPL CALC-SCNC: 9 MMOL/L (ref 8–16)
AST SERPL-CCNC: 24 U/L (ref 10–40)
BASOPHILS # BLD AUTO: 0.05 K/UL (ref 0–0.2)
BASOPHILS NFR BLD: 0.7 % (ref 0–1.9)
BILIRUB SERPL-MCNC: 0.3 MG/DL (ref 0.1–1)
BILIRUB UR QL STRIP: NEGATIVE
BUN SERPL-MCNC: 11 MG/DL (ref 6–20)
CALCIUM SERPL-MCNC: 9.8 MG/DL (ref 8.7–10.5)
CHLORIDE SERPL-SCNC: 104 MMOL/L (ref 95–110)
CHOLEST SERPL-MCNC: 213 MG/DL (ref 120–199)
CHOLEST/HDLC SERPL: 3.7 {RATIO} (ref 2–5)
CLARITY UR REFRACT.AUTO: CLEAR
CO2 SERPL-SCNC: 27 MMOL/L (ref 23–29)
COLOR UR AUTO: YELLOW
CREAT SERPL-MCNC: 0.8 MG/DL (ref 0.5–1.4)
DIFFERENTIAL METHOD: ABNORMAL
EOSINOPHIL # BLD AUTO: 0.1 K/UL (ref 0–0.5)
EOSINOPHIL NFR BLD: 1.5 % (ref 0–8)
ERYTHROCYTE [DISTWIDTH] IN BLOOD BY AUTOMATED COUNT: 13.7 % (ref 11.5–14.5)
EST. GFR  (AFRICAN AMERICAN): >60 ML/MIN/1.73 M^2
EST. GFR  (NON AFRICAN AMERICAN): >60 ML/MIN/1.73 M^2
ESTIMATED AVG GLUCOSE: 123 MG/DL (ref 68–131)
FERRITIN SERPL-MCNC: 24 NG/ML (ref 20–300)
GLUCOSE SERPL-MCNC: 96 MG/DL (ref 70–110)
GLUCOSE UR QL STRIP: NEGATIVE
HBA1C MFR BLD HPLC: 5.9 % (ref 4–5.6)
HCT VFR BLD AUTO: 39.9 % (ref 37–48.5)
HDLC SERPL-MCNC: 57 MG/DL (ref 40–75)
HDLC SERPL: 26.8 % (ref 20–50)
HGB BLD-MCNC: 12.7 G/DL (ref 12–16)
HGB UR QL STRIP: NEGATIVE
IMM GRANULOCYTES # BLD AUTO: 0.02 K/UL (ref 0–0.04)
IMM GRANULOCYTES NFR BLD AUTO: 0.3 % (ref 0–0.5)
IRON SERPL-MCNC: 52 UG/DL (ref 30–160)
KETONES UR QL STRIP: NEGATIVE
LDLC SERPL CALC-MCNC: 142.2 MG/DL (ref 63–159)
LEUKOCYTE ESTERASE UR QL STRIP: NEGATIVE
LYMPHOCYTES # BLD AUTO: 2.6 K/UL (ref 1–4.8)
LYMPHOCYTES NFR BLD: 38.3 % (ref 18–48)
MCH RBC QN AUTO: 30 PG (ref 27–31)
MCHC RBC AUTO-ENTMCNC: 31.8 G/DL (ref 32–36)
MCV RBC AUTO: 94 FL (ref 82–98)
MONOCYTES # BLD AUTO: 0.4 K/UL (ref 0.3–1)
MONOCYTES NFR BLD: 5.6 % (ref 4–15)
NEUTROPHILS # BLD AUTO: 3.6 K/UL (ref 1.8–7.7)
NEUTROPHILS NFR BLD: 53.6 % (ref 38–73)
NITRITE UR QL STRIP: NEGATIVE
NONHDLC SERPL-MCNC: 156 MG/DL
NRBC BLD-RTO: 0 /100 WBC
PH UR STRIP: 7 [PH] (ref 5–8)
PLATELET # BLD AUTO: 295 K/UL (ref 150–350)
PMV BLD AUTO: 11 FL (ref 9.2–12.9)
POTASSIUM SERPL-SCNC: 3.8 MMOL/L (ref 3.5–5.1)
PROT SERPL-MCNC: 7.4 G/DL (ref 6–8.4)
PROT UR QL STRIP: NEGATIVE
RBC # BLD AUTO: 4.23 M/UL (ref 4–5.4)
SATURATED IRON: 12 % (ref 20–50)
SODIUM SERPL-SCNC: 140 MMOL/L (ref 136–145)
SP GR UR STRIP: 1.01 (ref 1–1.03)
TOTAL IRON BINDING CAPACITY: 423 UG/DL (ref 250–450)
TRANSFERRIN SERPL-MCNC: 286 MG/DL (ref 200–375)
TRIGL SERPL-MCNC: 69 MG/DL (ref 30–150)
TSH SERPL DL<=0.005 MIU/L-ACNC: 1.1 UIU/ML (ref 0.4–4)
URN SPEC COLLECT METH UR: NORMAL
WBC # BLD AUTO: 6.76 K/UL (ref 3.9–12.7)

## 2019-12-13 PROCEDURE — 82728 ASSAY OF FERRITIN: CPT

## 2019-12-13 PROCEDURE — 99395 PR PREVENTIVE VISIT,EST,18-39: ICD-10-PCS | Mod: S$GLB,,, | Performed by: INTERNAL MEDICINE

## 2019-12-13 PROCEDURE — 83036 HEMOGLOBIN GLYCOSYLATED A1C: CPT

## 2019-12-13 PROCEDURE — 82306 VITAMIN D 25 HYDROXY: CPT

## 2019-12-13 PROCEDURE — 36415 COLL VENOUS BLD VENIPUNCTURE: CPT | Mod: PO

## 2019-12-13 PROCEDURE — 85025 COMPLETE CBC W/AUTO DIFF WBC: CPT

## 2019-12-13 PROCEDURE — 81003 URINALYSIS AUTO W/O SCOPE: CPT

## 2019-12-13 PROCEDURE — 80053 COMPREHEN METABOLIC PANEL: CPT

## 2019-12-13 PROCEDURE — 99999 PR PBB SHADOW E&M-EST. PATIENT-LVL III: CPT | Mod: PBBFAC,,, | Performed by: INTERNAL MEDICINE

## 2019-12-13 PROCEDURE — 99999 PR PBB SHADOW E&M-EST. PATIENT-LVL III: ICD-10-PCS | Mod: PBBFAC,,, | Performed by: INTERNAL MEDICINE

## 2019-12-13 PROCEDURE — 99395 PREV VISIT EST AGE 18-39: CPT | Mod: S$GLB,,, | Performed by: INTERNAL MEDICINE

## 2019-12-13 PROCEDURE — 80061 LIPID PANEL: CPT

## 2019-12-13 PROCEDURE — 84443 ASSAY THYROID STIM HORMONE: CPT

## 2019-12-13 PROCEDURE — 83540 ASSAY OF IRON: CPT

## 2019-12-13 RX ORDER — BUPROPION HYDROCHLORIDE 150 MG/1
150 TABLET ORAL DAILY
Qty: 30 TABLET | Refills: 0 | Status: SHIPPED | OUTPATIENT
Start: 2019-12-13 | End: 2020-02-24

## 2019-12-13 NOTE — PROGRESS NOTES
Subjective:      Kady Mares is a 34 y.o. female who presents for annual exam.    Plans to start a family within the next year.     Family History:  family history includes Diabetes in her father and mother; Hypertension in her father and mother; Kidney cancer in her father; Prostate cancer in her father.    Health Maintenance:  Health Maintenance       Date Due Completion Date    TETANUS VACCINE 02/28/2003 ---    Influenza Vaccine (1) 09/01/2019 ---    Pap Smear with HPV Cotest 12/07/2021 12/7/2018        Eye exam: 2018  Dental Exam: has appt  OB/GYN: Dr. Bond  Last Pap: 2018    Influenza: declines for now  Tetanus: likely UTD  Around 2014    Exercise: minimal  Body mass index is 41.01 kg/m².    Meds:   Current Outpatient Medications:     buPROPion (WELLBUTRIN XL) 150 MG TB24 tablet, Take 1 tablet (150 mg total) by mouth once daily., Disp: 30 tablet, Rfl: 0    meclizine (ANTIVERT) 50 MG tablet, Take 50 mg by mouth as needed. , Disp: , Rfl:     PMHx:   Past Medical History:   Diagnosis Date    Nephrolithiasis     Vertigo        PSHx:  Past Surgical History:   Procedure Laterality Date    MYOMECTOMY  09/22/2015       SocHx:   Social History     Socioeconomic History    Marital status:      Spouse name: Not on file    Number of children: Not on file    Years of education: Not on file    Highest education level: Not on file   Occupational History    Not on file   Social Needs    Financial resource strain: Not very hard    Food insecurity:     Worry: Never true     Inability: Never true    Transportation needs:     Medical: No     Non-medical: No   Tobacco Use    Smoking status: Never Smoker    Smokeless tobacco: Never Used   Substance and Sexual Activity    Alcohol use: No     Alcohol/week: 0.0 standard drinks     Frequency: Never     Binge frequency: Never    Drug use: No    Sexual activity: Not Currently   Lifestyle    Physical activity:     Days per week: 0 days      "Minutes per session: 0 min    Stress: Rather much   Relationships    Social connections:     Talks on phone: Once a week     Gets together: Once a week     Attends Jewish service: Not on file     Active member of club or organization: Yes     Attends meetings of clubs or organizations: More than 4 times per year     Relationship status:    Other Topics Concern    Not on file   Social History Narrative    Not on file       Review of Systems   Constitutional: Positive for appetite change (is a stress eater) and unexpected weight change. Negative for activity change, chills, diaphoresis, fatigue and fever.   HENT: Negative for congestion, dental problem, ear discharge, ear pain, hearing loss, postnasal drip, rhinorrhea, sinus pressure, sore throat and trouble swallowing.         Reports a "squeaking" sound that she hears at times, goes along with her pulse   Eyes: Negative for discharge, redness and visual disturbance.   Respiratory: Negative for cough, chest tightness, shortness of breath and wheezing.    Cardiovascular: Positive for chest pain and palpitations (on and off, last EKG). Negative for leg swelling.   Gastrointestinal: Negative for abdominal pain, constipation, diarrhea, nausea and vomiting.   Endocrine: Negative for cold intolerance, heat intolerance, polydipsia and polyuria.   Genitourinary: Negative for difficulty urinating, dysuria, frequency, hematuria, menstrual problem and urgency.   Musculoskeletal: Negative for arthralgias, joint swelling, myalgias and neck pain.   Skin: Negative for rash and wound.   Neurological: Positive for headaches (occasional headaches, possibly related to tension and stress). Negative for dizziness, weakness and numbness.   Hematological: Negative for adenopathy.   Psychiatric/Behavioral: Positive for dysphoric mood (reports improvement in mood with Lexapro, depression is less of a problem now). Negative for confusion and sleep disturbance. The patient is " nervous/anxious (stopped Lexapro due to constipation and decreased libido, reports improvement in anxiety).        Objective:      Physical Exam   Constitutional: She is oriented to person, place, and time. Vital signs are normal. She appears well-developed and well-nourished. No distress.   HENT:   Head: Normocephalic and atraumatic.   Right Ear: Hearing, tympanic membrane, external ear and ear canal normal. Tympanic membrane is not erythematous and not bulging.   Left Ear: Hearing, tympanic membrane, external ear and ear canal normal. Tympanic membrane is not erythematous and not bulging.   Nose: Nose normal.   Mouth/Throat: Uvula is midline, oropharynx is clear and moist and mucous membranes are normal. No oropharyngeal exudate or posterior oropharyngeal erythema.   Eyes: Pupils are equal, round, and reactive to light. Conjunctivae, EOM and lids are normal. No scleral icterus.   Neck: Normal range of motion. Neck supple. No thyroid mass and no thyromegaly present.   Cardiovascular: Normal rate, regular rhythm, normal heart sounds and intact distal pulses.   No murmur heard.  Pulmonary/Chest: Effort normal and breath sounds normal. She has no wheezes.   Abdominal: Soft. Bowel sounds are normal. She exhibits no distension. There is no tenderness. There is no rigidity, no rebound and no guarding.   Musculoskeletal: Normal range of motion. She exhibits no edema.   Lymphadenopathy:     She has no cervical adenopathy.        Right: No supraclavicular adenopathy present.        Left: No supraclavicular adenopathy present.   Neurological: She is alert and oriented to person, place, and time. She has normal reflexes. Coordination and gait normal.   Skin: Skin is warm, dry and intact. No rash noted. She is not diaphoretic.   Psychiatric: She has a normal mood and affect.   Vitals reviewed.      Assessment:       1. Annual physical exam    2. Anxiety    3. Mild depression    4. Vitamin D deficiency    5. Morbid obesity with  BMI of 40.0-44.9, adult        Plan:       1. Annual physical exam  - CBC auto differential; Future  - Comprehensive metabolic panel; Future  - Hemoglobin A1c; Future  - Lipid panel; Future  - TSH; Future  - Urinalysis; Future  - Vitamin D; Future  - Ferritin; Future  - Iron and TIBC; Future  - Urinalysis    2. Anxiety  - now off Lexapro, try bupropion  - TSH; Future  - buPROPion (WELLBUTRIN XL) 150 MG TB24 tablet; Take 1 tablet (150 mg total) by mouth once daily.  Dispense: 30 tablet; Refill: 0  - may need to taper off if she decides on pregnancy    3. Mild depression  - buPROPion (WELLBUTRIN XL) 150 MG TB24 tablet; Take 1 tablet (150 mg total) by mouth once daily.  Dispense: 30 tablet; Refill: 0    4. Vitamin D deficiency  - Vitamin D; Future    5. Morbid obesity  - discussed some options for weight loss, increase physical activity    RTC in 3 months or sooner if needed    Diamond Vasquez MD

## 2019-12-14 PROBLEM — R73.03 PREDIABETES: Status: ACTIVE | Noted: 2019-12-14

## 2020-02-22 ENCOUNTER — PATIENT MESSAGE (OUTPATIENT)
Dept: INTERNAL MEDICINE | Facility: CLINIC | Age: 35
End: 2020-02-22

## 2020-02-24 ENCOUNTER — PATIENT MESSAGE (OUTPATIENT)
Dept: INTERNAL MEDICINE | Facility: CLINIC | Age: 35
End: 2020-02-24

## 2020-02-24 RX ORDER — HYDROXYZINE HYDROCHLORIDE 25 MG/1
25 TABLET, FILM COATED ORAL 2 TIMES DAILY PRN
Qty: 20 TABLET | Refills: 0 | Status: SHIPPED | OUTPATIENT
Start: 2020-02-24 | End: 2020-03-05

## 2020-02-24 RX ORDER — FLUOXETINE 10 MG/1
10 CAPSULE ORAL DAILY
Qty: 30 CAPSULE | Refills: 0 | Status: SHIPPED | OUTPATIENT
Start: 2020-02-24 | End: 2021-08-06 | Stop reason: ALTCHOICE

## 2020-02-24 NOTE — TELEPHONE ENCOUNTER
Stop wellbutrin.     Will order hydroxyzine 25mg that she can take 1-2 times daily as needed for itch. May use before bed since it cases drowsiness.     Would she consider counseling to help with anxiety? Or she could try another medication like Prozac.

## 2020-02-24 NOTE — TELEPHONE ENCOUNTER
Pt returned call.    Notified and verbalized understanding of Dr Vasquez' message.    She would like to try Prozac.  Please send in Rx.    She is already seeing a therapist.

## 2020-08-05 ENCOUNTER — PATIENT MESSAGE (OUTPATIENT)
Dept: INTERNAL MEDICINE | Facility: CLINIC | Age: 35
End: 2020-08-05

## 2020-08-05 NOTE — TELEPHONE ENCOUNTER
Remeron is ok but tends to cause quite a few side effects. It can cause increased appetite, weight gain and increase in cholesterol levels.    Other options for treatment would be Cymbalta or Pristiq.    Video visit is ok.

## 2020-10-01 ENCOUNTER — PATIENT OUTREACH (OUTPATIENT)
Dept: ADMINISTRATIVE | Facility: OTHER | Age: 35
End: 2020-10-01

## 2020-10-01 NOTE — PROGRESS NOTES
LINKS immunization registry not responding  Care Everywhere updated  Health Maintenance updated  Chart reviewed for overdue Proactive [facility] Encounters (NIGEL) health maintenance testing (CRS, Breast Ca, Diabetic Eye Exam)   Orders entered:N/A

## 2020-10-02 ENCOUNTER — OFFICE VISIT (OUTPATIENT)
Dept: PODIATRY | Facility: CLINIC | Age: 35
End: 2020-10-02
Payer: COMMERCIAL

## 2020-10-02 ENCOUNTER — HOSPITAL ENCOUNTER (OUTPATIENT)
Dept: RADIOLOGY | Facility: HOSPITAL | Age: 35
Discharge: HOME | End: 2020-10-02
Attending: PODIATRIST
Payer: COMMERCIAL

## 2020-10-02 VITALS
BODY MASS INDEX: 40.98 KG/M2 | WEIGHT: 222.69 LBS | HEIGHT: 62 IN | SYSTOLIC BLOOD PRESSURE: 145 MMHG | DIASTOLIC BLOOD PRESSURE: 91 MMHG | HEART RATE: 87 BPM

## 2020-10-02 DIAGNOSIS — M79.672 LEFT FOOT PAIN: ICD-10-CM

## 2020-10-02 DIAGNOSIS — M79.672 LEFT FOOT PAIN: Primary | ICD-10-CM

## 2020-10-02 DIAGNOSIS — D36.10 NEUROMA: ICD-10-CM

## 2020-10-02 PROCEDURE — 99999 PR PBB SHADOW E&M-EST. PATIENT-LVL III: ICD-10-PCS | Mod: PBBFAC,,, | Performed by: PODIATRIST

## 2020-10-02 PROCEDURE — 99999 PR PBB SHADOW E&M-EST. PATIENT-LVL III: CPT | Mod: PBBFAC,,, | Performed by: PODIATRIST

## 2020-10-02 PROCEDURE — 73630 XR FOOT COMPLETE 3 VIEW LEFT: ICD-10-PCS | Mod: 26,LT,, | Performed by: RADIOLOGY

## 2020-10-02 PROCEDURE — 73630 X-RAY EXAM OF FOOT: CPT | Mod: TC,LT

## 2020-10-02 PROCEDURE — 3008F BODY MASS INDEX DOCD: CPT | Mod: CPTII,S$GLB,, | Performed by: PODIATRIST

## 2020-10-02 PROCEDURE — 64450 PR NERVE BLOCK INJ, ANES/STEROID, OTHER PERIPHERAL: ICD-10-PCS | Mod: S$GLB,,, | Performed by: PODIATRIST

## 2020-10-02 PROCEDURE — 64450 NJX AA&/STRD OTHER PN/BRANCH: CPT | Mod: S$GLB,,, | Performed by: PODIATRIST

## 2020-10-02 PROCEDURE — 3008F PR BODY MASS INDEX (BMI) DOCUMENTED: ICD-10-PCS | Mod: CPTII,S$GLB,, | Performed by: PODIATRIST

## 2020-10-02 PROCEDURE — 73630 X-RAY EXAM OF FOOT: CPT | Mod: 26,LT,, | Performed by: RADIOLOGY

## 2020-10-02 PROCEDURE — 99204 PR OFFICE/OUTPT VISIT, NEW, LEVL IV, 45-59 MIN: ICD-10-PCS | Mod: 25,S$GLB,, | Performed by: PODIATRIST

## 2020-10-02 PROCEDURE — 99204 OFFICE O/P NEW MOD 45 MIN: CPT | Mod: 25,S$GLB,, | Performed by: PODIATRIST

## 2020-10-02 RX ADMIN — DEXAMETHASONE SODIUM PHOSPHATE 4 MG: 4 INJECTION, SOLUTION INTRA-ARTICULAR; INTRALESIONAL; INTRAMUSCULAR; INTRAVENOUS; SOFT TISSUE at 09:10

## 2020-10-02 NOTE — LETTER
October 13, 2020      Diamond Vasquez MD  [address] [facility]  [place] [address]           [facility] [address]  [address]  [place] [address]  Phone: [phone]          Patient: Kady Mares   MR Number: [id number]   YOB: 1985   Date of Visit: 10/2/2020       Dear Dr. Diamond Vasquez:    Thank you for referring Kady Mares to me for evaluation. Attached you will find relevant portions of my assessment and plan of care.    If you have questions, please do not hesitate to call me. I look forward to following Kady Mares along with you.    Sincerely,    Brock Johns, ANASTASIIA    Enclosure  CC:  No Recipients    If you would like to receive this communication electronically, please contact [email or web address] or [phone] to request more information on [employer] Link access.    For providers and/or their staff who would like to refer a patient to [facility], please contact us through our one-stop-shop provider referral line, [facility], at [phone].    If you feel you have received this communication in error or would no longer like to receive these types of communications, please e-mail [email or web address]

## 2020-10-14 RX ORDER — DEXAMETHASONE SODIUM PHOSPHATE 4 MG/ML
4 INJECTION, SOLUTION INTRA-ARTICULAR; INTRALESIONAL; INTRAMUSCULAR; INTRAVENOUS; SOFT TISSUE
Status: COMPLETED | OUTPATIENT
Start: 2020-10-02 | End: 2020-10-02

## 2020-10-14 NOTE — PROGRESS NOTES
Subjective:      Patient ID: Kady Mares is a 35 y.o. female.    Chief Complaint: Foot Pain (left foot for close to a year )    Kady is a 35 y.o. female who presents to the podiatry clinic  with complaint of  left foot pain. Onset of the symptoms was several months ago. Precipitating event: none known. Current symptoms include: ability to bear weight, but with some pain. Aggravating factors: shoes. Symptoms have progressed to a point and plateaued. Patient has had no prior foot problems. Evaluation to date: none. Treatment to date: none. Patients rates pain 5/10 on pain scale.        Review of Systems   Constitution: Negative for chills, fever and malaise/fatigue.   HENT: Negative for hearing loss.    Cardiovascular: Negative for claudication.   Respiratory: Negative for shortness of breath.    Skin: Negative for flushing and rash.   Musculoskeletal: Negative for joint pain and myalgias.   Neurological: Negative for loss of balance, numbness, paresthesias and sensory change.   Psychiatric/Behavioral: Negative for altered mental status.   Allergic/Immunologic: Negative for hives.           Objective:      Physical Exam  Vitals signs reviewed.   Cardiovascular:      Pulses:           Dorsalis pedis pulses are 2+ on the right side and 2+ on the left side.        Posterior tibial pulses are 2+ on the right side and 2+ on the left side.      Comments: No edema noted to b/L LEs  Musculoskeletal: Normal range of motion.      Comments: Adequate joint ROM noted to all lower extremity muscle groups with no pain or crepitation noted. Muscle strength is 5/5 in all groups bilaterally.  There is pain on palpation of the left 1st interspece  intermetatarsal space with a positive Sujit's click. Minimal tenderness to palpation of the adjacent metatarsal heads.     Feet:      Right foot:      Protective Sensation: 5 sites tested. 5 sites sensed.      Skin integrity: Skin integrity normal.      Left foot:       Protective Sensation: 5 sites tested. 5 sites sensed.      Skin integrity: Skin integrity normal.   Skin:     General: Skin is warm.      Findings: No signs of injury, lesion, rash or wound.      Comments: Normal skin tugor noted.   No open lesion noted b/L  Skin temp is warm to warm from proximal to distal b/L.  Webspaces clean, dry, and intact  Nails x10 short   Neurological:      Mental Status: She is alert and oriented to person, place, and time.      Comments: Intact gross sensation noted to b/L LEs   Psychiatric:         Behavior: Behavior normal.               Assessment:       Encounter Diagnoses   Name Primary?    Left foot pain Yes    Neuroma - Left Foot          Plan:       Kady was seen today for foot pain.    Diagnoses and all orders for this visit:    Left foot pain  -     X-Ray Foot Complete Left; Future    Neuroma - Left Foot    Other orders  -     dexamethasone injection 4 mg      I counseled the patient on her conditions, their implications and medical management.    X-rays taken and reviewed, no fx or dislocations noted.     Pt advised that pain is likely due to neuroma.   Injection consisting of a 1:1 mixture of 1% lidocaine plain and dexamethasone given into the left 1st interspace . Pt tolerated injection well and voiced relief afterwards.   Shoe modification advised for the pt. Pt was advised to obtain shoes will accommodate foot deformities.     Pt advised on RICE and OTC NSAIDs for associated pain.   Call or return to clinic prn if these symptoms worsen or fail to improve as anticipated.      .

## 2021-01-04 ENCOUNTER — PATIENT MESSAGE (OUTPATIENT)
Dept: ADMINISTRATIVE | Facility: HOSPITAL | Age: 36
End: 2021-01-04

## 2021-04-05 ENCOUNTER — PATIENT MESSAGE (OUTPATIENT)
Dept: ADMINISTRATIVE | Facility: HOSPITAL | Age: 36
End: 2021-04-05

## 2021-04-16 ENCOUNTER — PATIENT MESSAGE (OUTPATIENT)
Dept: RESEARCH | Facility: HOSPITAL | Age: 36
End: 2021-04-16

## 2021-07-06 ENCOUNTER — PATIENT MESSAGE (OUTPATIENT)
Dept: ADMINISTRATIVE | Facility: HOSPITAL | Age: 36
End: 2021-07-06

## 2021-08-06 ENCOUNTER — OFFICE VISIT (OUTPATIENT)
Dept: PODIATRY | Facility: CLINIC | Age: 36
End: 2021-08-06
Payer: COMMERCIAL

## 2021-08-06 VITALS
SYSTOLIC BLOOD PRESSURE: 152 MMHG | HEIGHT: 62 IN | HEART RATE: 87 BPM | BODY MASS INDEX: 42.55 KG/M2 | DIASTOLIC BLOOD PRESSURE: 98 MMHG | WEIGHT: 231.25 LBS | RESPIRATION RATE: 18 BRPM

## 2021-08-06 DIAGNOSIS — D36.10 NEUROMA: Primary | ICD-10-CM

## 2021-08-06 PROCEDURE — 1159F MED LIST DOCD IN RCRD: CPT | Mod: CPTII,S$GLB,, | Performed by: PODIATRIST

## 2021-08-06 PROCEDURE — 3077F PR MOST RECENT SYSTOLIC BLOOD PRESSURE >= 140 MM HG: ICD-10-PCS | Mod: CPTII,S$GLB,, | Performed by: PODIATRIST

## 2021-08-06 PROCEDURE — 3077F SYST BP >= 140 MM HG: CPT | Mod: CPTII,S$GLB,, | Performed by: PODIATRIST

## 2021-08-06 PROCEDURE — 3008F PR BODY MASS INDEX (BMI) DOCUMENTED: ICD-10-PCS | Mod: CPTII,S$GLB,, | Performed by: PODIATRIST

## 2021-08-06 PROCEDURE — 3080F PR MOST RECENT DIASTOLIC BLOOD PRESSURE >= 90 MM HG: ICD-10-PCS | Mod: CPTII,S$GLB,, | Performed by: PODIATRIST

## 2021-08-06 PROCEDURE — 64455 PR INJECT ANES/STEROID PLANTAR COMMON DIGITAL NERVE: ICD-10-PCS | Mod: LT,S$GLB,, | Performed by: PODIATRIST

## 2021-08-06 PROCEDURE — 64455 NJX AA&/STRD PLTR COM DG NRV: CPT | Mod: LT,S$GLB,, | Performed by: PODIATRIST

## 2021-08-06 PROCEDURE — 99499 NO LOS: ICD-10-PCS | Mod: S$GLB,,, | Performed by: PODIATRIST

## 2021-08-06 PROCEDURE — 1159F PR MEDICATION LIST DOCUMENTED IN MEDICAL RECORD: ICD-10-PCS | Mod: CPTII,S$GLB,, | Performed by: PODIATRIST

## 2021-08-06 PROCEDURE — 99999 PR PBB SHADOW E&M-EST. PATIENT-LVL III: CPT | Mod: PBBFAC,,, | Performed by: PODIATRIST

## 2021-08-06 PROCEDURE — 3080F DIAST BP >= 90 MM HG: CPT | Mod: CPTII,S$GLB,, | Performed by: PODIATRIST

## 2021-08-06 PROCEDURE — 99999 PR PBB SHADOW E&M-EST. PATIENT-LVL III: ICD-10-PCS | Mod: PBBFAC,,, | Performed by: PODIATRIST

## 2021-08-06 PROCEDURE — 3008F BODY MASS INDEX DOCD: CPT | Mod: CPTII,S$GLB,, | Performed by: PODIATRIST

## 2021-08-06 PROCEDURE — 99499 UNLISTED E&M SERVICE: CPT | Mod: S$GLB,,, | Performed by: PODIATRIST

## 2021-08-06 RX ORDER — DEXAMETHASONE SODIUM PHOSPHATE 4 MG/ML
4 INJECTION, SOLUTION INTRA-ARTICULAR; INTRALESIONAL; INTRAMUSCULAR; INTRAVENOUS; SOFT TISSUE
Status: COMPLETED | OUTPATIENT
Start: 2021-08-06 | End: 2021-08-06

## 2021-08-06 RX ORDER — PROPRANOLOL HYDROCHLORIDE 10 MG/1
10 TABLET ORAL 3 TIMES DAILY
COMMUNITY
Start: 2021-04-02 | End: 2022-12-01 | Stop reason: SDUPTHER

## 2021-08-06 RX ORDER — VENLAFAXINE HYDROCHLORIDE 150 MG/1
CAPSULE, EXTENDED RELEASE ORAL
COMMUNITY
Start: 2021-08-05 | End: 2022-02-11

## 2021-08-06 RX ORDER — VENLAFAXINE HYDROCHLORIDE 150 MG/1
CAPSULE, EXTENDED RELEASE ORAL
COMMUNITY
Start: 2020-10-15 | End: 2022-12-01 | Stop reason: SDUPTHER

## 2021-08-06 RX ADMIN — DEXAMETHASONE SODIUM PHOSPHATE 4 MG: 4 INJECTION, SOLUTION INTRA-ARTICULAR; INTRALESIONAL; INTRAMUSCULAR; INTRAVENOUS; SOFT TISSUE at 08:08

## 2021-10-04 ENCOUNTER — PATIENT MESSAGE (OUTPATIENT)
Dept: ADMINISTRATIVE | Facility: HOSPITAL | Age: 36
End: 2021-10-04

## 2022-01-05 ENCOUNTER — TELEPHONE (OUTPATIENT)
Dept: INTERNAL MEDICINE | Facility: CLINIC | Age: 37
End: 2022-01-05
Payer: COMMERCIAL

## 2022-01-05 NOTE — TELEPHONE ENCOUNTER
FW: Appointment scheduled from Patient Portal   Raiza De La Fuente MA   Sent:   2:29 PM   To: Diamond Vasquez MD    Kady Mares   MRN: [id number] : 1985   Pt Home: [phone]     Entered: [phone]          Message    Pt scheduled appt online for , chest pain.   See her message below.      Please advise on whether she needs earlier appointment or ER         ----- Message -----   From: [facility], System Message   Sent: 2022   1:07 PM CST   To: [facility] Clinical Support   Subject: Appointment scheduled from Patient Portal          Appointment For: Kady Mares ([id number])   Visit Type: MYCHART FOLLOWUP/OFFICE VISIT (2382)      2022   11:30 AM  30 mins.  Diamond Vasquez MD      [facility] INTERNAL MEDICINE      Patient Comments:   Multiple episodes of chest pain at rest and with activity. Cardiac assessment for angina vs panic attack. Propranolol helps relieve pain.

## 2022-01-05 NOTE — TELEPHONE ENCOUNTER
----- Message from Kisha Cotter sent at 1/5/2022  3:57 PM CST -----  Contact: [phone]  Patient is returning a phone call.  Who left a message for the patient: Gilma   Does patient know what this is regarding:    Would you like a call back, or a response through your [facility] portal?:  call back   Comments:

## 2022-01-05 NOTE — TELEPHONE ENCOUNTER
I spoke to pt, she reported her chest pain have been going on for about 1 1/2 weeks.   She had this episode before and it was due to her anxiety.  This time it feels like it's not her anxiety but more chest/heart related     Pt was advised to go to ER for evaluation.  Pt verbalized understanding.

## 2022-01-05 NOTE — TELEPHONE ENCOUNTER
I have not seen her since 2019.    Please find out any additional symptoms related to her chest pain? When did it start?    It appears that she is being treated for anxiety with Effexor. It was prescribed by someone outside of [facility]. Does she have a psychiatrist?    Needs an appointment with any provider available.

## 2022-01-19 ENCOUNTER — OFFICE VISIT (OUTPATIENT)
Dept: DERMATOLOGY | Facility: CLINIC | Age: 37
End: 2022-01-19
Payer: COMMERCIAL

## 2022-01-19 ENCOUNTER — PATIENT MESSAGE (OUTPATIENT)
Dept: DERMATOLOGY | Facility: CLINIC | Age: 37
End: 2022-01-19

## 2022-01-19 DIAGNOSIS — B35.1 ONYCHOMYCOSIS: Primary | ICD-10-CM

## 2022-01-19 PROCEDURE — 1160F PR REVIEW ALL MEDS BY PRESCRIBER/CLIN PHARMACIST DOCUMENTED: ICD-10-PCS | Mod: CPTII,95,, | Performed by: DERMATOLOGY

## 2022-01-19 PROCEDURE — 99202 PR OFFICE/OUTPT VISIT, NEW, LEVL II, 15-29 MIN: ICD-10-PCS | Mod: 95,,, | Performed by: DERMATOLOGY

## 2022-01-19 PROCEDURE — 1159F PR MEDICATION LIST DOCUMENTED IN MEDICAL RECORD: ICD-10-PCS | Mod: CPTII,95,, | Performed by: DERMATOLOGY

## 2022-01-19 PROCEDURE — 1159F MED LIST DOCD IN RCRD: CPT | Mod: CPTII,95,, | Performed by: DERMATOLOGY

## 2022-01-19 PROCEDURE — 1160F RVW MEDS BY RX/DR IN RCRD: CPT | Mod: CPTII,95,, | Performed by: DERMATOLOGY

## 2022-01-19 PROCEDURE — 99202 OFFICE O/P NEW SF 15 MIN: CPT | Mod: 95,,, | Performed by: DERMATOLOGY

## 2022-01-19 RX ORDER — CICLOPIROX 80 MG/ML
SOLUTION TOPICAL
Qty: 6.6 ML | Refills: 6 | Status: SHIPPED | OUTPATIENT
Start: 2022-01-19 | End: 2023-03-21

## 2022-01-19 RX ORDER — EFINACONAZOLE 100 MG/ML
SOLUTION TOPICAL
Qty: 4 ML | Refills: 3 | Status: SHIPPED | OUTPATIENT
Start: 2022-01-19 | End: 2022-10-19 | Stop reason: SDUPTHER

## 2022-01-19 NOTE — PROGRESS NOTES
Subjective:       Patient ID:  Kady Mares is a 36 y.o. female who presents for No chief complaint on file.    The patient location is: car  The chief complaint leading to consultation is: nail fungal infection    Visit type: audiovisual    Face to Face time with patient: 15 min  20 minutes of total time spent on the encounter, which includes face to face time and non-face to face time preparing to see the patient (eg, review of tests), Obtaining and/or reviewing separately obtained history, Documenting clinical information in the electronic or other health record, Independently interpreting results (not separately reported) and communicating results to the patient/family/caregiver, or Care coordination (not separately reported).         Each patient to whom he or she provides medical services by telemedicine is:  (1) informed of the relationship between the physician and patient and the respective role of any other health care provider with respect to management of the patient; and (2) notified that he or she may decline to receive medical services by telemedicine and may withdraw from such care at any time.    Notes:     History of Present Illness: The patient presents with chief complaint of nail infection.  Location: right 1st - 3rd  Duration: 10 years  Signs/Symptoms: discoloration    Prior treatments: none          Review of Systems   Constitutional: Negative for fever and chills.   Gastrointestinal: Negative for nausea and vomiting.   Skin: Positive for activity-related sunscreen use. Negative for daily sunscreen use and recent sunburn.   Hematologic/Lymphatic: Does not bruise/bleed easily.        Objective:    Physical Exam   Constitutional: She appears well-developed and well-nourished. No distress.   Neurological: She is alert and oriented to person, place, and time. She is not disoriented.   Psychiatric: She has a normal mood and affect.   Skin:   Areas Examined (abnormalities noted in  diagram):   Head / Face Inspection Performed  Neck Inspection Performed  RUE Inspected  LUE Inspection Performed  Nails and Digits Inspection Performed              Assessment / Plan:        Onychomycosis  -     efinaconazole (JUBLIA) 10 % Terrie; Apply once daily to nail and nail fold.  Use daily for up to 1 year.  Dispense: 4 mL; Refill: 3  -     ciclopirox (PENLAC) 8 % Soln; Apply to nail and nail fold once daily for up to 1 year  Dispense: 6.6 mL; Refill: 6  -      Possible yeast given location. Will start either jublia or penlac based on insurance, recommend vinegar soaks 3 times/week.  F/u in 3-4 months.             Follow up in about 3 months (around 4/19/2022).

## 2022-02-10 ENCOUNTER — TELEPHONE (OUTPATIENT)
Dept: INTERNAL MEDICINE | Facility: CLINIC | Age: 37
End: 2022-02-10
Payer: COMMERCIAL

## 2022-02-10 NOTE — TELEPHONE ENCOUNTER
Spoke to pt.    She didn't go to ER in January when instructed.    Said she spoke to her psychiatrist who believed her symptoms were due to anxiety and prescribed propranolol    Pt doesn't want to go to ER

## 2022-02-11 ENCOUNTER — LAB VISIT (OUTPATIENT)
Dept: LAB | Facility: HOSPITAL | Age: 37
End: 2022-02-11
Attending: INTERNAL MEDICINE
Payer: COMMERCIAL

## 2022-02-11 ENCOUNTER — OFFICE VISIT (OUTPATIENT)
Dept: INTERNAL MEDICINE | Facility: CLINIC | Age: 37
End: 2022-02-11
Payer: COMMERCIAL

## 2022-02-11 VITALS
OXYGEN SATURATION: 98 % | DIASTOLIC BLOOD PRESSURE: 90 MMHG | TEMPERATURE: 98 F | BODY MASS INDEX: 41.59 KG/M2 | WEIGHT: 226 LBS | HEIGHT: 62 IN | HEART RATE: 93 BPM | SYSTOLIC BLOOD PRESSURE: 142 MMHG | RESPIRATION RATE: 17 BRPM

## 2022-02-11 DIAGNOSIS — F41.1 GENERALIZED ANXIETY DISORDER: ICD-10-CM

## 2022-02-11 DIAGNOSIS — R03.0 ELEVATED BLOOD PRESSURE READING: ICD-10-CM

## 2022-02-11 DIAGNOSIS — R07.89 ATYPICAL CHEST PAIN: Primary | ICD-10-CM

## 2022-02-11 DIAGNOSIS — R07.89 ATYPICAL CHEST PAIN: ICD-10-CM

## 2022-02-11 DIAGNOSIS — Z00.00 ANNUAL PHYSICAL EXAM: Primary | ICD-10-CM

## 2022-02-11 DIAGNOSIS — Z00.00 ANNUAL PHYSICAL EXAM: ICD-10-CM

## 2022-02-11 LAB
ALBUMIN SERPL BCP-MCNC: 4 G/DL (ref 3.5–5.2)
ALP SERPL-CCNC: 76 U/L (ref 55–135)
ALT SERPL W/O P-5'-P-CCNC: 20 U/L (ref 10–44)
ANION GAP SERPL CALC-SCNC: 11 MMOL/L (ref 8–16)
AST SERPL-CCNC: 17 U/L (ref 10–40)
BASOPHILS # BLD AUTO: 0.04 K/UL (ref 0–0.2)
BASOPHILS NFR BLD: 0.5 % (ref 0–1.9)
BILIRUB SERPL-MCNC: 0.2 MG/DL (ref 0.1–1)
BILIRUB UR QL STRIP: NEGATIVE
BUN SERPL-MCNC: 7 MG/DL (ref 6–20)
CALCIUM SERPL-MCNC: 9.6 MG/DL (ref 8.7–10.5)
CHLORIDE SERPL-SCNC: 101 MMOL/L (ref 95–110)
CHOLEST SERPL-MCNC: 210 MG/DL (ref 120–199)
CHOLEST/HDLC SERPL: 3.6 {RATIO} (ref 2–5)
CLARITY UR REFRACT.AUTO: CLEAR
CO2 SERPL-SCNC: 26 MMOL/L (ref 23–29)
COLOR UR AUTO: YELLOW
CREAT SERPL-MCNC: 0.8 MG/DL (ref 0.5–1.4)
DIFFERENTIAL METHOD: ABNORMAL
EOSINOPHIL # BLD AUTO: 0.1 K/UL (ref 0–0.5)
EOSINOPHIL NFR BLD: 1.5 % (ref 0–8)
ERYTHROCYTE [DISTWIDTH] IN BLOOD BY AUTOMATED COUNT: 13.2 % (ref 11.5–14.5)
EST. GFR  (AFRICAN AMERICAN): >60 ML/MIN/1.73 M^2
EST. GFR  (NON AFRICAN AMERICAN): >60 ML/MIN/1.73 M^2
GLUCOSE SERPL-MCNC: 93 MG/DL (ref 70–110)
GLUCOSE UR QL STRIP: NEGATIVE
HCT VFR BLD AUTO: 38.2 % (ref 37–48.5)
HDLC SERPL-MCNC: 58 MG/DL (ref 40–75)
HDLC SERPL: 27.6 % (ref 20–50)
HGB BLD-MCNC: 12.2 G/DL (ref 12–16)
HGB UR QL STRIP: NEGATIVE
IMM GRANULOCYTES # BLD AUTO: 0.01 K/UL (ref 0–0.04)
IMM GRANULOCYTES NFR BLD AUTO: 0.1 % (ref 0–0.5)
KETONES UR QL STRIP: NEGATIVE
LDLC SERPL CALC-MCNC: 135.8 MG/DL (ref 63–159)
LEUKOCYTE ESTERASE UR QL STRIP: NEGATIVE
LYMPHOCYTES # BLD AUTO: 2.8 K/UL (ref 1–4.8)
LYMPHOCYTES NFR BLD: 38.1 % (ref 18–48)
MCH RBC QN AUTO: 30.4 PG (ref 27–31)
MCHC RBC AUTO-ENTMCNC: 31.9 G/DL (ref 32–36)
MCV RBC AUTO: 95 FL (ref 82–98)
MONOCYTES # BLD AUTO: 0.4 K/UL (ref 0.3–1)
MONOCYTES NFR BLD: 6 % (ref 4–15)
NEUTROPHILS # BLD AUTO: 3.9 K/UL (ref 1.8–7.7)
NEUTROPHILS NFR BLD: 53.8 % (ref 38–73)
NITRITE UR QL STRIP: NEGATIVE
NONHDLC SERPL-MCNC: 152 MG/DL
NRBC BLD-RTO: 0 /100 WBC
PH UR STRIP: 5 [PH] (ref 5–8)
PLATELET # BLD AUTO: 304 K/UL (ref 150–450)
PMV BLD AUTO: 11 FL (ref 9.2–12.9)
POTASSIUM SERPL-SCNC: 3.8 MMOL/L (ref 3.5–5.1)
PROT SERPL-MCNC: 7.5 G/DL (ref 6–8.4)
PROT UR QL STRIP: NEGATIVE
RBC # BLD AUTO: 4.01 M/UL (ref 4–5.4)
SODIUM SERPL-SCNC: 138 MMOL/L (ref 136–145)
SP GR UR STRIP: 1.02 (ref 1–1.03)
TRIGL SERPL-MCNC: 81 MG/DL (ref 30–150)
URN SPEC COLLECT METH UR: NORMAL
WBC # BLD AUTO: 7.29 K/UL (ref 3.9–12.7)

## 2022-02-11 PROCEDURE — 1159F MED LIST DOCD IN RCRD: CPT | Mod: CPTII,S$GLB,, | Performed by: INTERNAL MEDICINE

## 2022-02-11 PROCEDURE — 3077F PR MOST RECENT SYSTOLIC BLOOD PRESSURE >= 140 MM HG: ICD-10-PCS | Mod: CPTII,S$GLB,, | Performed by: INTERNAL MEDICINE

## 2022-02-11 PROCEDURE — 99213 PR OFFICE/OUTPT VISIT, EST, LEVL III, 20-29 MIN: ICD-10-PCS | Mod: S$GLB,,, | Performed by: INTERNAL MEDICINE

## 2022-02-11 PROCEDURE — 3008F PR BODY MASS INDEX (BMI) DOCUMENTED: ICD-10-PCS | Mod: CPTII,S$GLB,, | Performed by: INTERNAL MEDICINE

## 2022-02-11 PROCEDURE — 3008F BODY MASS INDEX DOCD: CPT | Mod: CPTII,S$GLB,, | Performed by: INTERNAL MEDICINE

## 2022-02-11 PROCEDURE — 1159F PR MEDICATION LIST DOCUMENTED IN MEDICAL RECORD: ICD-10-PCS | Mod: CPTII,S$GLB,, | Performed by: INTERNAL MEDICINE

## 2022-02-11 PROCEDURE — 36415 COLL VENOUS BLD VENIPUNCTURE: CPT | Mod: PO | Performed by: INTERNAL MEDICINE

## 2022-02-11 PROCEDURE — 83036 HEMOGLOBIN GLYCOSYLATED A1C: CPT | Performed by: INTERNAL MEDICINE

## 2022-02-11 PROCEDURE — 3044F HG A1C LEVEL LT 7.0%: CPT | Mod: CPTII,S$GLB,, | Performed by: INTERNAL MEDICINE

## 2022-02-11 PROCEDURE — 93010 EKG 12-LEAD: ICD-10-PCS | Mod: S$GLB,,, | Performed by: INTERNAL MEDICINE

## 2022-02-11 PROCEDURE — 99999 PR PBB SHADOW E&M-EST. PATIENT-LVL IV: CPT | Mod: PBBFAC,,, | Performed by: INTERNAL MEDICINE

## 2022-02-11 PROCEDURE — 80061 LIPID PANEL: CPT | Performed by: INTERNAL MEDICINE

## 2022-02-11 PROCEDURE — 93005 ELECTROCARDIOGRAM TRACING: CPT | Mod: S$GLB,,, | Performed by: INTERNAL MEDICINE

## 2022-02-11 PROCEDURE — 99213 OFFICE O/P EST LOW 20 MIN: CPT | Mod: S$GLB,,, | Performed by: INTERNAL MEDICINE

## 2022-02-11 PROCEDURE — 81003 URINALYSIS AUTO W/O SCOPE: CPT | Performed by: INTERNAL MEDICINE

## 2022-02-11 PROCEDURE — 3077F SYST BP >= 140 MM HG: CPT | Mod: CPTII,S$GLB,, | Performed by: INTERNAL MEDICINE

## 2022-02-11 PROCEDURE — 84443 ASSAY THYROID STIM HORMONE: CPT | Performed by: INTERNAL MEDICINE

## 2022-02-11 PROCEDURE — 99999 PR PBB SHADOW E&M-EST. PATIENT-LVL IV: ICD-10-PCS | Mod: PBBFAC,,, | Performed by: INTERNAL MEDICINE

## 2022-02-11 PROCEDURE — 3080F PR MOST RECENT DIASTOLIC BLOOD PRESSURE >= 90 MM HG: ICD-10-PCS | Mod: CPTII,S$GLB,, | Performed by: INTERNAL MEDICINE

## 2022-02-11 PROCEDURE — 3080F DIAST BP >= 90 MM HG: CPT | Mod: CPTII,S$GLB,, | Performed by: INTERNAL MEDICINE

## 2022-02-11 PROCEDURE — 3044F PR MOST RECENT HEMOGLOBIN A1C LEVEL <7.0%: ICD-10-PCS | Mod: CPTII,S$GLB,, | Performed by: INTERNAL MEDICINE

## 2022-02-11 PROCEDURE — 1160F RVW MEDS BY RX/DR IN RCRD: CPT | Mod: CPTII,S$GLB,, | Performed by: INTERNAL MEDICINE

## 2022-02-11 PROCEDURE — 93010 ELECTROCARDIOGRAM REPORT: CPT | Mod: S$GLB,,, | Performed by: INTERNAL MEDICINE

## 2022-02-11 PROCEDURE — 1160F PR REVIEW ALL MEDS BY PRESCRIBER/CLIN PHARMACIST DOCUMENTED: ICD-10-PCS | Mod: CPTII,S$GLB,, | Performed by: INTERNAL MEDICINE

## 2022-02-11 PROCEDURE — 80053 COMPREHEN METABOLIC PANEL: CPT | Performed by: INTERNAL MEDICINE

## 2022-02-11 PROCEDURE — 93005 EKG 12-LEAD: ICD-10-PCS | Mod: S$GLB,,, | Performed by: INTERNAL MEDICINE

## 2022-02-11 PROCEDURE — 85025 COMPLETE CBC W/AUTO DIFF WBC: CPT | Performed by: INTERNAL MEDICINE

## 2022-02-11 NOTE — PROGRESS NOTES
Subjective:     Kady Mares is a 37 y.o. female who presents for   Chief Complaint   Patient presents with    Chest Pain       Chest Pain   This is a new problem. The current episode started in the past 7 days. The onset quality is undetermined. The problem occurs intermittently. The problem has been waxing and waning. The pain is present in the substernal region. The pain is mild. The quality of the pain is described as heavy. The pain does not radiate. Associated symptoms include headaches. Pertinent negatives include no abdominal pain, cough, diaphoresis, dizziness, fever, irregular heartbeat, lower extremity edema, nausea, palpitations, shortness of breath, sputum production, vomiting or weakness. The pain is aggravated by nothing. She has tried nothing for the symptoms. Risk factors include lack of exercise, obesity and sedentary lifestyle.   Pertinent negatives for past medical history include no congenital heart disease, no diabetes, no recent injury and no thyroid problem.   Her family medical history is significant for diabetes and hypertension.     Body mass index is 41.33 kg/m².      Review of Systems   Constitutional: Negative for activity change, chills, diaphoresis, fever and unexpected weight change.   HENT: Negative for congestion, ear pain, rhinorrhea, sinus pressure and trouble swallowing.    Eyes: Negative for discharge, redness and visual disturbance.   Respiratory: Negative for cough, sputum production, shortness of breath and wheezing.    Cardiovascular: Positive for chest pain. Negative for palpitations and leg swelling.   Gastrointestinal: Negative for abdominal pain, blood in stool, constipation, diarrhea, nausea and vomiting.   Endocrine: Negative for polydipsia and polyuria.   Genitourinary: Negative for difficulty urinating, dysuria, hematuria and menstrual problem.   Musculoskeletal: Positive for arthralgias. Negative for joint swelling, myalgias and neck pain.    Skin: Negative for rash and wound.   Neurological: Positive for headaches. Negative for dizziness, tremors and weakness.   Psychiatric/Behavioral: Positive for dysphoric mood. Negative for confusion. The patient is nervous/anxious (takes effexor and PRN propranolol).          Answers for HPI/ROS submitted by the patient on 2/11/2022  activity change: No  unexpected weight change: No  neck pain: No  hearing loss: No  rhinorrhea: No  trouble swallowing: No  eye discharge: No  visual disturbance: No  chest tightness: No  wheezing: No  chest pain: Yes  palpitations: No  blood in stool: No  constipation: No  vomiting: No  diarrhea: No  polydipsia: No  polyuria: No  difficulty urinating: No  hematuria: No  menstrual problem: No  dysuria: No  joint swelling: No  arthralgias: Yes  headaches: Yes  weakness: No  confusion: No  dysphoric mood: Yes          Objective:     Physical Exam  Vitals reviewed.   Constitutional:       General: She is awake. She is not in acute distress.     Appearance: Normal appearance. She is well-developed and well-groomed.   HENT:      Head: Normocephalic and atraumatic.      Right Ear: Hearing and external ear normal.      Left Ear: Hearing and external ear normal.      Nose: Nose normal. No congestion.      Mouth/Throat:      Mouth: Mucous membranes are moist.   Eyes:      General: Lids are normal. Vision grossly intact.   Cardiovascular:      Rate and Rhythm: Normal rate and regular rhythm.      Heart sounds: Normal heart sounds. No murmur heard.  Pulmonary:      Effort: Pulmonary effort is normal.      Breath sounds: Normal breath sounds. No decreased breath sounds or wheezing.   Chest:      Chest wall: No swelling or tenderness.   Abdominal:      General: Bowel sounds are normal. There is no distension.   Musculoskeletal:         General: Normal range of motion.      Cervical back: Normal range of motion and neck supple.      Right lower leg: No edema.      Left lower leg: No edema.    Skin:     General: Skin is warm and dry.      Findings: No lesion or rash.   Neurological:      Mental Status: She is alert and oriented to person, place, and time.   Psychiatric:         Attention and Perception: Attention normal.         Mood and Affect: Mood normal.         Behavior: Behavior is cooperative.            Assessment:      1. Atypical chest pain    2. Generalized anxiety disorder    3. Elevated blood pressure reading           Plan:     1. Atypical chest pain  - IN OFFICE EKG 12-LEAD (to Muse)  - CBC Auto Differential; Future  - TSH; Future    2. Generalized anxiety disorder  - sees psychiatry, stable with effexor    3. Elevated blood pressure reading  - return for BP check, may need to treat    RTC in 1 month for annual exam or sooner if needed    __________________________    Diamond Vasquez MD, PharmD  [facility]- [facility] Medicine  American Board of Obesity Medicine diplomate  Office [phone]

## 2022-02-12 LAB
ESTIMATED AVG GLUCOSE: 117 MG/DL (ref 68–131)
HBA1C MFR BLD: 5.7 % (ref 4–5.6)
TSH SERPL DL<=0.005 MIU/L-ACNC: 1.2 UIU/ML (ref 0.4–4)

## 2022-02-28 PROBLEM — F32.A MILD DEPRESSION: Status: RESOLVED | Noted: 2018-11-02 | Resolved: 2022-02-28

## 2022-02-28 PROBLEM — R03.0 ELEVATED BLOOD PRESSURE READING: Status: ACTIVE | Noted: 2022-02-28

## 2022-02-28 PROBLEM — F41.1 GENERALIZED ANXIETY DISORDER: Status: ACTIVE | Noted: 2022-02-28

## 2022-03-10 PROCEDURE — 99285 EMERGENCY DEPT VISIT HI MDM: CPT | Mod: 25

## 2022-03-10 PROCEDURE — 99285 EMERGENCY DEPT VISIT HI MDM: CPT | Mod: ,,, | Performed by: EMERGENCY MEDICINE

## 2022-03-10 PROCEDURE — 99285 PR EMERGENCY DEPT VISIT,LEVEL V: ICD-10-PCS | Mod: ,,, | Performed by: EMERGENCY MEDICINE

## 2022-03-10 PROCEDURE — 96374 THER/PROPH/DIAG INJ IV PUSH: CPT

## 2022-03-11 ENCOUNTER — HOSPITAL ENCOUNTER (EMERGENCY)
Facility: HOSPITAL | Age: 37
Discharge: HOME | End: 2022-03-11
Attending: EMERGENCY MEDICINE
Payer: COMMERCIAL

## 2022-03-11 VITALS
DIASTOLIC BLOOD PRESSURE: 94 MMHG | BODY MASS INDEX: 41.15 KG/M2 | RESPIRATION RATE: 18 BRPM | TEMPERATURE: 99 F | OXYGEN SATURATION: 99 % | WEIGHT: 225 LBS | HEART RATE: 91 BPM | SYSTOLIC BLOOD PRESSURE: 173 MMHG

## 2022-03-11 DIAGNOSIS — R07.9 CHEST PAIN: Primary | ICD-10-CM

## 2022-03-11 PROBLEM — R73.03 PREDIABETES: Chronic | Status: ACTIVE | Noted: 2019-12-14

## 2022-03-11 PROBLEM — F41.1 GENERALIZED ANXIETY DISORDER: Chronic | Status: ACTIVE | Noted: 2022-02-28

## 2022-03-11 PROBLEM — E66.01 MORBID OBESITY WITH BMI OF 40.0-44.9, ADULT: Chronic | Status: ACTIVE | Noted: 2018-11-02

## 2022-03-11 LAB
ALBUMIN SERPL BCP-MCNC: 3.8 G/DL (ref 3.5–5.2)
ALP SERPL-CCNC: 81 U/L (ref 55–135)
ALT SERPL W/O P-5'-P-CCNC: 15 U/L (ref 10–44)
ANION GAP SERPL CALC-SCNC: 13 MMOL/L (ref 8–16)
AST SERPL-CCNC: 17 U/L (ref 10–40)
BASOPHILS # BLD AUTO: 0.04 K/UL (ref 0–0.2)
BASOPHILS NFR BLD: 0.4 % (ref 0–1.9)
BILIRUB SERPL-MCNC: 0.1 MG/DL (ref 0.1–1)
BUN SERPL-MCNC: 14 MG/DL (ref 6–20)
CALCIUM SERPL-MCNC: 9.3 MG/DL (ref 8.7–10.5)
CHLORIDE SERPL-SCNC: 101 MMOL/L (ref 95–110)
CO2 SERPL-SCNC: 25 MMOL/L (ref 23–29)
CREAT SERPL-MCNC: 0.9 MG/DL (ref 0.5–1.4)
DIFFERENTIAL METHOD: NORMAL
EOSINOPHIL # BLD AUTO: 0.2 K/UL (ref 0–0.5)
EOSINOPHIL NFR BLD: 2.3 % (ref 0–8)
ERYTHROCYTE [DISTWIDTH] IN BLOOD BY AUTOMATED COUNT: 13.6 % (ref 11.5–14.5)
EST. GFR  (AFRICAN AMERICAN): >60 ML/MIN/1.73 M^2
EST. GFR  (NON AFRICAN AMERICAN): >60 ML/MIN/1.73 M^2
GLUCOSE SERPL-MCNC: 112 MG/DL (ref 70–110)
HCT VFR BLD AUTO: 38.8 % (ref 37–48.5)
HGB BLD-MCNC: 12.5 G/DL (ref 12–16)
IMM GRANULOCYTES # BLD AUTO: 0.03 K/UL (ref 0–0.04)
IMM GRANULOCYTES NFR BLD AUTO: 0.3 % (ref 0–0.5)
LYMPHOCYTES # BLD AUTO: 3.5 K/UL (ref 1–4.8)
LYMPHOCYTES NFR BLD: 38.8 % (ref 18–48)
MCH RBC QN AUTO: 30.5 PG (ref 27–31)
MCHC RBC AUTO-ENTMCNC: 32.2 G/DL (ref 32–36)
MCV RBC AUTO: 95 FL (ref 82–98)
MONOCYTES # BLD AUTO: 0.6 K/UL (ref 0.3–1)
MONOCYTES NFR BLD: 6.8 % (ref 4–15)
NEUTROPHILS # BLD AUTO: 4.6 K/UL (ref 1.8–7.7)
NEUTROPHILS NFR BLD: 51.4 % (ref 38–73)
NRBC BLD-RTO: 0 /100 WBC
PLATELET # BLD AUTO: 287 K/UL (ref 150–450)
PMV BLD AUTO: 11.1 FL (ref 9.2–12.9)
POTASSIUM SERPL-SCNC: 3.5 MMOL/L (ref 3.5–5.1)
PROT SERPL-MCNC: 7.3 G/DL (ref 6–8.4)
RBC # BLD AUTO: 4.1 M/UL (ref 4–5.4)
SODIUM SERPL-SCNC: 139 MMOL/L (ref 136–145)
TROPONIN I SERPL DL<=0.01 NG/ML-MCNC: <0.006 NG/ML (ref 0–0.03)
WBC # BLD AUTO: 8.95 K/UL (ref 3.9–12.7)

## 2022-03-11 PROCEDURE — 93010 ELECTROCARDIOGRAM REPORT: CPT | Mod: ,,, | Performed by: INTERNAL MEDICINE

## 2022-03-11 PROCEDURE — 84484 ASSAY OF TROPONIN QUANT: CPT | Performed by: EMERGENCY MEDICINE

## 2022-03-11 PROCEDURE — 93010 EKG 12-LEAD: ICD-10-PCS | Mod: ,,, | Performed by: INTERNAL MEDICINE

## 2022-03-11 PROCEDURE — 80053 COMPREHEN METABOLIC PANEL: CPT | Performed by: EMERGENCY MEDICINE

## 2022-03-11 PROCEDURE — 85025 COMPLETE CBC W/AUTO DIFF WBC: CPT | Performed by: EMERGENCY MEDICINE

## 2022-03-11 PROCEDURE — 63600175 PHARM REV CODE 636 W HCPCS: Performed by: EMERGENCY MEDICINE

## 2022-03-11 PROCEDURE — 93005 ELECTROCARDIOGRAM TRACING: CPT

## 2022-03-11 RX ORDER — KETOROLAC TROMETHAMINE 30 MG/ML
10 INJECTION, SOLUTION INTRAMUSCULAR; INTRAVENOUS
Status: COMPLETED | OUTPATIENT
Start: 2022-03-11 | End: 2022-03-11

## 2022-03-11 RX ORDER — IBUPROFEN 600 MG/1
600 TABLET ORAL EVERY 6 HOURS PRN
Qty: 20 TABLET | Refills: 0 | Status: SHIPPED | OUTPATIENT
Start: 2022-03-11 | End: 2022-03-16

## 2022-03-11 RX ADMIN — KETOROLAC TROMETHAMINE 10 MG: 30 INJECTION, SOLUTION INTRAMUSCULAR at 01:03

## 2022-03-11 NOTE — DISCHARGE INSTRUCTIONS
Your blood pressure was elevated in the emergency department.  You must follow-up with your primary care doctor for adjustment of your medicine.    Diagnosis: chest pain    Tests you had showed: EKG and labs did not show a heart attack.    Home Care Instructions:  - Continue taking your home medications as prescribed    Take ibuprofen (also called Advil, Motrin) for your pain. This medicine is available over-the-counter in 200 mg tablets.  - You may take 600 mg every 6 hours, or 800 mg every 8 hours as needed   - Do not take more than this amount, as it can cause kidney problems, bleeding in your stomach, and other serious problems.   - Do not also take naproxen (Aleve) at the same time or on the same day  - If you have heart problems or uncontrolled high blood pressure, you should not take ibuprofen for more than 3 days without discussing with your doctor    If your pain is not controlled with ibuprofen, you may also take acetaminophen (also called Tylenol).  - You may take up to 1,000 mg of Tylenol every 6 hours as needed  - Do not take more than 4,000 mg in 24 hours (1 day) as this may cause liver damage  - Many other medicines include acetaminophen (Tylenol) such as: Norco, Vicodin, Tylenol #3, many cold medicines, etc.  - Please read all labels carefully and do not combine medicines that include acetaminophen.  - If you have a history of liver disease or drink alcohol heavily, do not take acetaminophen (Tylenol) since it can damage your liver    Follow-Up Plan:  - Follow-up with: Primary care doctor within 3 - 5 days  - Follow-up for additional testing and/or evaluation as directed by your primary doctor    Return to the Emergency Department for symptoms including but not limited to: worsening symptoms, shortness of breath or chest pain, vomiting with inability to hold down fluids, fevers greater than 100.4°F, dizziness, passing out/fainting/unconsciousness, or other concerning symptoms.

## 2022-03-11 NOTE — ED NOTES
Patient identifiers verified and correct for Vishnu Daily   C/C: pt c/o midsternal chest wall pain onset 3 days ago   APPEARANCE: awake and alert in no acute distress.  SKIN: warm, dry and intact. No breakdown or bruising.  MUSCULOSKELETAL: Patient moving all extremities spontaneously, no obvious swelling or deformities noted. Ambulates independently.  RESPIRATORY: Denies shortness of breath. Respirations unlabored.   CARDIAC:midsternal CP, 2+ distal pulses; no peripheral edema  ABDOMEN: soft, non-tender, and non-distended, Denies N/V  : voids spontaneously, denies difficulty  Neurologic: AAO x 4; follows commands equal strength in all extremities; denies numbness/tingling. Denies dizziness

## 2022-03-11 NOTE — ED PROVIDER NOTES
Source of History:  Patient  Chart    Chief complaint:  Chest Pain (C/o dull midsternal pain for the past 3 days. Denies significant cx hx. )      HPI:  Kady Mares is a 37 y.o. female with history of morbid obesity, prediabetes, generalized anxiety disorder presenting to emergency department with complaint of chest pain.    Patient states that she has had 3 days of waxing and waning dull pain in the left side of her chest, deep inside.  Not associated with shortness of breath.  No radiation.  Pain is not pleuritic.  No fevers, chills, cough.  No trauma.  Has history of similar pains in the past which have been transient.    Patient states that she has not taken any medication for pain prior to arrival.  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    No personal or family history of DVT or PE.  No hemoptysis.  No lower extremity pain or swelling.  No hormone use.  No recent travel or immobilization.    ROS: As per HPI and below:  Review of Systems   Constitutional: Negative for fever.   HENT: Negative for sore throat.    Eyes: Negative for double vision.   Respiratory: Negative for cough and shortness of breath.    Cardiovascular: Positive for chest pain.   Gastrointestinal: Negative for abdominal pain and vomiting.   Genitourinary: Negative for dysuria.   Musculoskeletal: Negative for falls.   Skin: Negative for rash.   Neurological: Negative for headaches.       Review of patient's allergies indicates:   Allergen Reactions    Wellbutrin [bupropion hcl] Itching       No current facility-administered medications on file prior to encounter.     Current Outpatient Medications on File Prior to Encounter   Medication Sig Dispense Refill    ciclopirox (PENLAC) 8 % Soln Apply to nail and nail fold once daily for up to 1 year 6.6 mL 6    efinaconazole (JUBLIA) 10 % Terrie Apply once daily to nail and nail fold.  Use daily for up to 1 year. 4 mL 3    propranoloL (INDERAL) 10 MG tablet Take  10 mg by mouth 3 (three) times daily.      venlafaxine (EFFEXOR-XR) 150 MG Cp24          PMH:  As per HPI and below:  Past Medical History:   Diagnosis Date    Mild depression 11/2/2018    Nephrolithiasis     Vertigo      Past Surgical History:   Procedure Laterality Date    MYOMECTOMY  09/22/2015       Social History     Socioeconomic History    Marital status:    Tobacco Use    Smoking status: Never Smoker    Smokeless tobacco: Never Used   Substance and Sexual Activity    Alcohol use: No     Alcohol/week: 0.0 standard drinks    Drug use: No    Sexual activity: Not Currently     Social Determinants of Health     Financial Resource Strain: Low Risk     Difficulty of Paying Living Expenses: Not very hard   Food Insecurity: No Food Insecurity    Worried About Running Out of Food in the Last Year: Never true    Ran Out of Food in the Last Year: Never true   Transportation Needs: No Transportation Needs    Lack of Transportation (Medical): No    Lack of Transportation (Non-Medical): No   Physical Activity: Insufficiently Active    Days of Exercise per Week: 3 days    Minutes of Exercise per Session: 20 min   Stress: Stress Concern Present    Feeling of Stress : To some extent   Social Connections: Unknown    Frequency of Communication with Friends and Family: Three times a week    Frequency of Social Gatherings with Friends and Family: Patient refused    Active Member of Clubs or Organizations: Yes    Attends Club or Organization Meetings: More than 4 times per year    Marital Status:    Housing Stability: Low Risk     Unable to Pay for Housing in the Last Year: No    Number of Places Lived in the Last Year: 1    Unstable Housing in the Last Year: No       Family History   Problem Relation Age of Onset    Diabetes Mother     Hypertension Mother     Heart disease Mother     Pacemaker/defibrilator Mother     Diabetes Father     Hypertension Father     Prostate cancer Father      Kidney cancer Father     Breast cancer Neg Hx     Colon cancer Neg Hx     Ovarian cancer Neg Hx        Physical Exam:      Vitals:    03/11/22 0007   BP: (!) 173/94   Pulse: 91   Resp: 18   Temp: 99 °F (37.2 °C)     Gen: No acute distress.  Nontoxic.  Well appearing.  Mental Status:  Alert and oriented .  Appropriate, conversant.  Skin: Warm, dry. No rashes seen.  Eyes: No conjunctival injection.  Pulm: CTAB. No increased work of breathing.  No significant tachypnea.  No audible stridor or wheezing.  No conversational dyspnea.    CV: Regular rate. Regular rhythm.   Abd: Soft.  Not distended.  Nontender.   MSK: Good range of motion all joints.  No deformities.  No chest wall tenderness to palpation.  Neuro: Awake. Speech normal. No focal neuro deficit observed.      Laboratory Studies:  Labs Reviewed   COMPREHENSIVE METABOLIC PANEL - Abnormal; Notable for the following components:       Result Value    Glucose 112 (*)     All other components within normal limits   CBC W/ AUTO DIFFERENTIAL   TROPONIN I       EKG (independently interpreted by me):  Normal sinus rhythm, rate 84. No STEMI.  QRS 82 milliseconds.   milliseconds.    X-rays (independently interpreted by me): No acute abnormality    Chart reviewed.     Imaging Results          X-Ray Chest PA And Lateral (Final result)  Result time 03/11/22 02:14:41    Final result by Kade Quarles MD (03/11/22 02:14:41)                 Impression:      No acute cardiopulmonary process.      Electronically signed by: Kade Quarles MD  Date:    03/11/2022  Time:    02:14             Narrative:    EXAMINATION:  XR CHEST PA AND LATERAL    CLINICAL HISTORY:  Chest pain, unspecified    TECHNIQUE:  PA and lateral views of the chest were performed.    COMPARISON:  None.    FINDINGS:  There is no consolidation, effusion, or pneumothorax.    Cardiomediastinal silhouette is unremarkable.    Regional osseous structures are unremarkable.                                 Medications Given:  Medications   ketorolac injection 9.999 mg (9.999 mg Intravenous Given 3/11/22 0108)       MDM:    37 y.o. female with the above described chest pain.     Differential diagnosis includes but is not limited to: ACS, pneumonia, PE, pneumothorax, pulmonary edema/CHF, aortic dissection, pericarditis, intra-abdominal process.    - EKG and history do not support the diagnosis of pericarditis.   - There is no evidence of pneumothorax or infiltrate on CXR.   - Aortic dissection considered, but presenting symptoms felt uncharacteristic, chest X-ray shows no evidence of mediastinal widening and there are strong, equal and symmetric pulses. Given the current presentation, aortic dissection is felt unlikely.  - History, CXR, and exam do not suggest pulmonary edema/congestive heart failure   - Pulmonary embolism was considered but felt unlikely due to the compendium of presenting elements leading to a low pre-test probability followed by a negative PERC rule. All 8 of the rule out PERC criteria were present including: Age<50, HR <100, O2 sat > 94%, no recent trauma/surgery, no hemoptysis, no exogenous hormone use, no clinical signs suggestive of DVT, no hx DVT/PE. Given the above, there is a <2% risk of PE and I feel that no further diagnostic testing is needed.  - Intra-abdominal pathology felt unlikely given benign/non tender abdominal exam.   - Acute coronary syndrome considered but there are negative serial biomarkers, no acute ischemic EKG changes, and the patient has a low HEART score. Based on this, I feel that there is low risk for short-term major adverse cardiac event.     I have discussed this with the patient and reviewed options for inpatient and outpatient management. The patient verbalizes an excellent understanding of the above including presence of small risk of short-term major adverse cardiac event even in the setting of low HEART score, negative cardiac biomarker, and compendium of  elements of this presentation. The patient wishes to pursue further workup on as an outpatient.     Diagnostic Impression:    1. Chest pain         ED Disposition Condition    Discharge Stable        ED Prescriptions     Medication Sig Dispense Start Date End Date Auth. Provider    ibuprofen (ADVIL,MOTRIN) 600 MG tablet Take 1 tablet (600 mg total) by mouth every 6 (six) hours as needed for Pain. 20 tablet 3/11/2022 3/16/2022 Kisha Barillas MD        Follow-up Information     Follow up With Specialties Details Why Contact Info    Diamond Vasquez MD Internal Medicine Schedule an appointment as soon as possible for a visit   [address] [facility] [address]  [phone]            Patient understands the plan and is in agreement, verbalized understanding, questions answered    Kisha Barillas MD  Emergency Medicine       Kisha Barillas MD  03/11/22 [id number]

## 2022-03-18 ENCOUNTER — OFFICE VISIT (OUTPATIENT)
Dept: INTERNAL MEDICINE | Facility: CLINIC | Age: 37
End: 2022-03-18
Payer: COMMERCIAL

## 2022-03-18 ENCOUNTER — LAB VISIT (OUTPATIENT)
Dept: LAB | Facility: HOSPITAL | Age: 37
End: 2022-03-18
Attending: INTERNAL MEDICINE
Payer: COMMERCIAL

## 2022-03-18 VITALS
DIASTOLIC BLOOD PRESSURE: 105 MMHG | HEIGHT: 62 IN | SYSTOLIC BLOOD PRESSURE: 151 MMHG | WEIGHT: 227.31 LBS | OXYGEN SATURATION: 99 % | HEART RATE: 82 BPM | TEMPERATURE: 97 F | BODY MASS INDEX: 41.83 KG/M2 | RESPIRATION RATE: 18 BRPM

## 2022-03-18 DIAGNOSIS — R07.89 ATYPICAL CHEST PAIN: ICD-10-CM

## 2022-03-18 DIAGNOSIS — Z11.59 NEED FOR HEPATITIS C SCREENING TEST: ICD-10-CM

## 2022-03-18 DIAGNOSIS — M25.50 ARTHRALGIA, UNSPECIFIED JOINT: ICD-10-CM

## 2022-03-18 DIAGNOSIS — Z00.00 ANNUAL PHYSICAL EXAM: Primary | ICD-10-CM

## 2022-03-18 DIAGNOSIS — E66.01 MORBID OBESITY WITH BMI OF 40.0-44.9, ADULT: ICD-10-CM

## 2022-03-18 DIAGNOSIS — R73.03 PREDIABETES: ICD-10-CM

## 2022-03-18 DIAGNOSIS — Z11.4 ENCOUNTER FOR SCREENING FOR HIV: ICD-10-CM

## 2022-03-18 DIAGNOSIS — F41.1 GENERALIZED ANXIETY DISORDER: ICD-10-CM

## 2022-03-18 DIAGNOSIS — I10 ESSENTIAL HYPERTENSION: ICD-10-CM

## 2022-03-18 LAB
CRP SERPL-MCNC: 8.9 MG/L (ref 0–8.2)
ERYTHROCYTE [SEDIMENTATION RATE] IN BLOOD BY WESTERGREN METHOD: 27 MM/HR (ref 0–36)

## 2022-03-18 PROCEDURE — 1160F PR REVIEW ALL MEDS BY PRESCRIBER/CLIN PHARMACIST DOCUMENTED: ICD-10-PCS | Mod: CPTII,S$GLB,, | Performed by: INTERNAL MEDICINE

## 2022-03-18 PROCEDURE — 3080F PR MOST RECENT DIASTOLIC BLOOD PRESSURE >= 90 MM HG: ICD-10-PCS | Mod: CPTII,S$GLB,, | Performed by: INTERNAL MEDICINE

## 2022-03-18 PROCEDURE — 1160F RVW MEDS BY RX/DR IN RCRD: CPT | Mod: CPTII,S$GLB,, | Performed by: INTERNAL MEDICINE

## 2022-03-18 PROCEDURE — 99395 PREV VISIT EST AGE 18-39: CPT | Mod: 25,S$GLB,, | Performed by: INTERNAL MEDICINE

## 2022-03-18 PROCEDURE — 3077F PR MOST RECENT SYSTOLIC BLOOD PRESSURE >= 140 MM HG: ICD-10-PCS | Mod: CPTII,S$GLB,, | Performed by: INTERNAL MEDICINE

## 2022-03-18 PROCEDURE — 86140 C-REACTIVE PROTEIN: CPT | Performed by: INTERNAL MEDICINE

## 2022-03-18 PROCEDURE — 3008F PR BODY MASS INDEX (BMI) DOCUMENTED: ICD-10-PCS | Mod: CPTII,S$GLB,, | Performed by: INTERNAL MEDICINE

## 2022-03-18 PROCEDURE — 90714 TD VACCINE GREATER THAN OR EQUAL TO 7YO PRESERVATIVE FREE IM: ICD-10-PCS | Mod: S$GLB,,, | Performed by: INTERNAL MEDICINE

## 2022-03-18 PROCEDURE — 3044F PR MOST RECENT HEMOGLOBIN A1C LEVEL <7.0%: ICD-10-PCS | Mod: CPTII,S$GLB,, | Performed by: INTERNAL MEDICINE

## 2022-03-18 PROCEDURE — 90471 IMMUNIZATION ADMIN: CPT | Mod: S$GLB,,, | Performed by: INTERNAL MEDICINE

## 2022-03-18 PROCEDURE — 99395 PR PREVENTIVE VISIT,EST,18-39: ICD-10-PCS | Mod: 25,S$GLB,, | Performed by: INTERNAL MEDICINE

## 2022-03-18 PROCEDURE — 99999 PR PBB SHADOW E&M-EST. PATIENT-LVL V: CPT | Mod: PBBFAC,,, | Performed by: INTERNAL MEDICINE

## 2022-03-18 PROCEDURE — 87389 HIV-1 AG W/HIV-1&-2 AB AG IA: CPT | Performed by: INTERNAL MEDICINE

## 2022-03-18 PROCEDURE — 36415 COLL VENOUS BLD VENIPUNCTURE: CPT | Mod: PO | Performed by: INTERNAL MEDICINE

## 2022-03-18 PROCEDURE — 1159F MED LIST DOCD IN RCRD: CPT | Mod: CPTII,S$GLB,, | Performed by: INTERNAL MEDICINE

## 2022-03-18 PROCEDURE — 3044F HG A1C LEVEL LT 7.0%: CPT | Mod: CPTII,S$GLB,, | Performed by: INTERNAL MEDICINE

## 2022-03-18 PROCEDURE — 86038 ANTINUCLEAR ANTIBODIES: CPT | Performed by: INTERNAL MEDICINE

## 2022-03-18 PROCEDURE — 3077F SYST BP >= 140 MM HG: CPT | Mod: CPTII,S$GLB,, | Performed by: INTERNAL MEDICINE

## 2022-03-18 PROCEDURE — 1159F PR MEDICATION LIST DOCUMENTED IN MEDICAL RECORD: ICD-10-PCS | Mod: CPTII,S$GLB,, | Performed by: INTERNAL MEDICINE

## 2022-03-18 PROCEDURE — 99999 PR PBB SHADOW E&M-EST. PATIENT-LVL V: ICD-10-PCS | Mod: PBBFAC,,, | Performed by: INTERNAL MEDICINE

## 2022-03-18 PROCEDURE — 85652 RBC SED RATE AUTOMATED: CPT | Performed by: INTERNAL MEDICINE

## 2022-03-18 PROCEDURE — 86803 HEPATITIS C AB TEST: CPT | Performed by: INTERNAL MEDICINE

## 2022-03-18 PROCEDURE — 90714 TD VACC NO PRESV 7 YRS+ IM: CPT | Mod: S$GLB,,, | Performed by: INTERNAL MEDICINE

## 2022-03-18 PROCEDURE — 3080F DIAST BP >= 90 MM HG: CPT | Mod: CPTII,S$GLB,, | Performed by: INTERNAL MEDICINE

## 2022-03-18 PROCEDURE — 3008F BODY MASS INDEX DOCD: CPT | Mod: CPTII,S$GLB,, | Performed by: INTERNAL MEDICINE

## 2022-03-18 PROCEDURE — 90471 TD VACCINE GREATER THAN OR EQUAL TO 7YO PRESERVATIVE FREE IM: ICD-10-PCS | Mod: S$GLB,,, | Performed by: INTERNAL MEDICINE

## 2022-03-18 RX ORDER — HYDROCHLOROTHIAZIDE 25 MG/1
25 TABLET ORAL DAILY
Qty: 30 TABLET | Refills: 0 | Status: SHIPPED | OUTPATIENT
Start: 2022-03-18 | End: 2022-04-26 | Stop reason: SDUPTHER

## 2022-03-18 NOTE — PROGRESS NOTES
Subjective:     PCP: MD Kady Gilliland Maria Elena Mares is a 37 y.o. female who presents for an annual exam.    Medical History:   Past Medical History:   Diagnosis Date    Mild depression 11/2/2018    Nephrolithiasis     Vertigo        Family History: family history includes Diabetes in her father and mother; Heart disease in her mother; Hypertension in her father and mother; Kidney cancer in her father; Pacemaker/defibrilator in her mother; Prostate cancer in her father.    Surgical History:   Past Surgical History:   Procedure Laterality Date    MYOMECTOMY  09/22/2015        Social History:  reports that she has never smoked. She has never used smokeless tobacco. She reports that she does not drink alcohol and does not use drugs.     Allergies:   Review of patient's allergies indicates:   Allergen Reactions    Wellbutrin [bupropion hcl] Itching       Medications:   Current Outpatient Medications   Medication Sig    ciclopirox (PENLAC) 8 % Soln Apply to nail and nail fold once daily for up to 1 year    efinaconazole (JUBLIA) 10 % Terrie Apply once daily to nail and nail fold.  Use daily for up to 1 year.    propranoloL (INDERAL) 10 MG tablet Take 10 mg by mouth 3 (three) times daily.    venlafaxine (EFFEXOR-XR) 150 MG Cp24     hydroCHLOROthiazide (HYDRODIURIL) 25 MG tablet Take 1 tablet (25 mg total) by mouth once daily.     No current facility-administered medications for this visit.       Health Maintenance:   Health Maintenance Topics with due status: Not Due       Topic Last Completion Date    Cervical Cancer Screening 12/07/2018    TETANUS VACCINE 03/18/2022      Eye Exam:   3 years ago   Dental Exam: scheduled   OB/GYN: Dr. Bay   Pap smear: 2018   HIV: due   Hepatitic C  Ab: due    Vaccinations:  Immunization History   Administered Date(s) Administered    COVID-19, MRNA, LN-S, PF (MODERNA FULL 0.5 ML DOSE) 06/17/2021, 07/20/2021, 01/16/2022    Td - PF (ADULT)  03/18/2022      Influenza: due   Tetanus: due   Covid vaccine: boosted    Body mass index is 41.57 kg/m².  Wt Readings from Last 3 Encounters:   03/18/22 103.1 kg (227 lb 4.7 oz)   03/11/22 102.1 kg (225 lb)   02/11/22 102.5 kg (225 lb 15.5 oz)       Review of Systems   Constitutional: Negative for chills, diaphoresis, fatigue and fever.   HENT: Negative for congestion, dental problem, ear discharge, ear pain, mouth sores, postnasal drip, rhinorrhea, sinus pressure, sore throat and trouble swallowing.    Eyes: Negative for redness and visual disturbance.   Respiratory: Negative for cough, chest tightness and shortness of breath.    Cardiovascular: Positive for chest pain. Negative for palpitations and leg swelling.        Episodes of chest pain, evaluated in ER, neg workup   Gastrointestinal: Negative for abdominal pain, blood in stool, constipation, diarrhea, nausea and vomiting.   Endocrine: Positive for heat intolerance. Negative for cold intolerance, polydipsia and polyuria.   Genitourinary: Negative for decreased urine volume, dysuria, frequency and hematuria.   Musculoskeletal: Positive for arthralgias (episodes of elbow or ankle pain). Negative for back pain and myalgias.   Skin: Negative for rash and wound.   Neurological: Negative for dizziness, weakness, numbness and headaches.   Hematological: Negative for adenopathy.   Psychiatric/Behavioral: Negative for dysphoric mood and sleep disturbance. The patient is nervous/anxious.           Objective:     Physical Exam  Vitals reviewed.   Constitutional:       General: She is awake. She is not in acute distress.     Appearance: Normal appearance. She is well-developed and well-groomed. She is not diaphoretic.   HENT:      Head: Normocephalic and atraumatic.      Right Ear: Hearing, tympanic membrane, ear canal and external ear normal. Tympanic membrane is not erythematous or bulging.      Left Ear: Hearing, tympanic membrane, ear canal and external ear  normal. Tympanic membrane is not erythematous or bulging.      Nose: Nose normal. No congestion.      Mouth/Throat:      Mouth: Mucous membranes are moist.      Tongue: No lesions.      Pharynx: Oropharynx is clear. Uvula midline. No oropharyngeal exudate or posterior oropharyngeal erythema.   Eyes:      General: Lids are normal. Vision grossly intact. Gaze aligned appropriately. No scleral icterus.     Conjunctiva/sclera:      Right eye: Right conjunctiva is not injected.      Left eye: Left conjunctiva is not injected.      Pupils: Pupils are equal, round, and reactive to light.   Neck:      Thyroid: No thyroid mass or thyromegaly.   Cardiovascular:      Rate and Rhythm: Normal rate and regular rhythm.      Pulses: Normal pulses.      Heart sounds: Normal heart sounds. No murmur heard.  Pulmonary:      Effort: Pulmonary effort is normal. No respiratory distress.      Breath sounds: Normal breath sounds. No decreased breath sounds or wheezing.   Chest:      Chest wall: No mass, deformity or tenderness.   Breasts:      Right: No supraclavicular adenopathy.      Left: No supraclavicular adenopathy.       Abdominal:      General: Bowel sounds are normal. There is no distension.      Palpations: Abdomen is soft. Abdomen is not rigid.      Tenderness: There is no abdominal tenderness. There is no guarding or rebound.   Musculoskeletal:         General: Normal range of motion.      Cervical back: Normal range of motion and neck supple.      Right lower leg: No edema.      Left lower leg: No edema.   Lymphadenopathy:      Cervical: No cervical adenopathy.      Upper Body:      Right upper body: No supraclavicular adenopathy.      Left upper body: No supraclavicular adenopathy.   Skin:     General: Skin is warm and dry.      Coloration: Skin is not cyanotic.      Findings: No lesion or rash.      Nails: There is no clubbing.   Neurological:      General: No focal deficit present.      Mental Status: She is alert and  oriented to person, place, and time.      Sensory: Sensation is intact.      Coordination: Coordination is intact.      Gait: Gait is intact.      Deep Tendon Reflexes: Reflexes are normal and symmetric.   Psychiatric:         Attention and Perception: Attention normal.         Mood and Affect: Mood normal.         Behavior: Behavior is cooperative.            Assessment:        1. Annual physical exam    2. Essential hypertension    3. Atypical chest pain    4. Generalized anxiety disorder    5. Prediabetes    6. Arthralgia, unspecified joint    7. Morbid obesity with BMI of 40.0-44.9, adult    8. Need for hepatitis C screening test    9. Encounter for screening for HIV           Plan:     1. Annual physical exam  - reviewed recent labs with patient  -  Td Vaccine (Adult) - Preservative Free    2. Essential hypertension  -  Start HCTZ, monitor BP closely and will adjust regimen as needed  - hydroCHLOROthiazide (HYDRODIURIL) 25 MG tablet; Take 1 tablet (25 mg total) by mouth once daily.  Dispense: 30 tablet; Refill: 0    3. Atypical chest pain  - likely musculoskeletal, resolved, re-evaluate if it recurs    4. Generalized anxiety disorder  - she reports recent medication change, f/u with Psychiatry    5. Prediabetes  - last HbA1c was 5.7  - reduce sugar intake, increase physical activity    6. Arthralgia, unspecified joint  - TANIA Screen w/Reflex; Future  - C-reactive protein; Future  - Sedimentation rate; Future    7. Morbid obesity with BMI of 40.0-44.9, adult    8. Need for hepatitis C screening test  - Hepatitis C Antibody; Future    9. Encounter for screening for HIV  - HIV 1/2 Ag/Ab (4th Gen); Future      RTC in 3 months for follow-up or sooner if needed    __________________________    Diamond Vasquez MD, PharmD  [facility]- Internal Medicine  American Board of Obesity Medicine diplomate  Office [phone]

## 2022-03-21 LAB
ANA SER QL IF: NORMAL
HCV AB SERPL QL IA: NEGATIVE

## 2022-03-22 LAB — HIV 1+2 AB+HIV1 P24 AG SERPL QL IA: NEGATIVE

## 2022-03-28 ENCOUNTER — PATIENT MESSAGE (OUTPATIENT)
Dept: ADMINISTRATIVE | Facility: HOSPITAL | Age: 37
End: 2022-03-28
Payer: COMMERCIAL

## 2022-03-31 ENCOUNTER — TELEPHONE (OUTPATIENT)
Dept: INTERNAL MEDICINE | Facility: CLINIC | Age: 37
End: 2022-03-31
Payer: COMMERCIAL

## 2022-03-31 PROBLEM — I10 ESSENTIAL HYPERTENSION: Status: ACTIVE | Noted: 2022-03-31

## 2022-03-31 NOTE — TELEPHONE ENCOUNTER
----- Message from Diamond Vasquez MD sent at 3/31/2022  2:23 AM CDT -----  >>>>>>>>>> annual recall 3/19/2023

## 2022-04-26 DIAGNOSIS — I10 ESSENTIAL HYPERTENSION: ICD-10-CM

## 2022-04-27 NOTE — TELEPHONE ENCOUNTER
No new care gaps identified.  Powered by [employer]. Reference number: [id number].   4/26/2022 11:16:22 PM CDT

## 2022-04-29 RX ORDER — HYDROCHLOROTHIAZIDE 25 MG/1
25 TABLET ORAL DAILY
Qty: 30 TABLET | Refills: 1 | Status: SHIPPED | OUTPATIENT
Start: 2022-04-29 | End: 2022-10-19 | Stop reason: SDUPTHER

## 2022-05-23 ENCOUNTER — PATIENT OUTREACH (OUTPATIENT)
Dept: ADMINISTRATIVE | Facility: HOSPITAL | Age: 37
End: 2022-05-23
Payer: COMMERCIAL

## 2022-05-23 ENCOUNTER — PATIENT MESSAGE (OUTPATIENT)
Dept: ADMINISTRATIVE | Facility: HOSPITAL | Age: 37
End: 2022-05-23
Payer: COMMERCIAL

## 2022-07-07 ENCOUNTER — PATIENT MESSAGE (OUTPATIENT)
Dept: INTERNAL MEDICINE | Facility: CLINIC | Age: 37
End: 2022-07-07

## 2022-07-07 DIAGNOSIS — U07.1 COVID-19 VIRUS INFECTION: Primary | ICD-10-CM

## 2022-07-08 ENCOUNTER — TELEPHONE (OUTPATIENT)
Dept: INTERNAL MEDICINE | Facility: CLINIC | Age: 37
End: 2022-07-08

## 2022-07-21 ENCOUNTER — PATIENT MESSAGE (OUTPATIENT)
Dept: ADMINISTRATIVE | Facility: HOSPITAL | Age: 37
End: 2022-07-21
Payer: COMMERCIAL

## 2022-10-19 DIAGNOSIS — I10 ESSENTIAL HYPERTENSION: ICD-10-CM

## 2022-10-19 RX ORDER — HYDROCHLOROTHIAZIDE 25 MG/1
25 TABLET ORAL DAILY
Qty: 30 TABLET | Refills: 1 | Status: SHIPPED | OUTPATIENT
Start: 2022-10-19 | End: 2022-12-01

## 2022-10-19 NOTE — TELEPHONE ENCOUNTER
No new care gaps identified.  [facility] Embedded Care Gaps. Reference number: [id number]. 10/19/2022   12:41:20 AM CDT

## 2022-11-03 ENCOUNTER — PATIENT MESSAGE (OUTPATIENT)
Dept: ADMINISTRATIVE | Facility: OTHER | Age: 37
End: 2022-11-03
Payer: COMMERCIAL

## 2022-11-17 ENCOUNTER — TELEPHONE (OUTPATIENT)
Dept: PHARMACY | Facility: CLINIC | Age: 37
End: 2022-11-17
Payer: COMMERCIAL

## 2022-12-01 ENCOUNTER — LAB VISIT (OUTPATIENT)
Dept: LAB | Facility: HOSPITAL | Age: 37
End: 2022-12-01
Attending: INTERNAL MEDICINE
Payer: COMMERCIAL

## 2022-12-01 ENCOUNTER — OFFICE VISIT (OUTPATIENT)
Dept: INTERNAL MEDICINE | Facility: CLINIC | Age: 37
End: 2022-12-01
Payer: COMMERCIAL

## 2022-12-01 VITALS
DIASTOLIC BLOOD PRESSURE: 88 MMHG | HEIGHT: 62 IN | WEIGHT: 222 LBS | HEART RATE: 76 BPM | SYSTOLIC BLOOD PRESSURE: 128 MMHG | BODY MASS INDEX: 40.85 KG/M2 | TEMPERATURE: 98 F | RESPIRATION RATE: 16 BRPM

## 2022-12-01 DIAGNOSIS — R73.03 PREDIABETES: ICD-10-CM

## 2022-12-01 DIAGNOSIS — I10 ESSENTIAL HYPERTENSION: ICD-10-CM

## 2022-12-01 DIAGNOSIS — F41.1 GENERALIZED ANXIETY DISORDER: ICD-10-CM

## 2022-12-01 DIAGNOSIS — I10 ESSENTIAL HYPERTENSION: Primary | ICD-10-CM

## 2022-12-01 LAB
ANION GAP SERPL CALC-SCNC: 11 MMOL/L (ref 8–16)
BUN SERPL-MCNC: 11 MG/DL (ref 6–20)
CALCIUM SERPL-MCNC: 10.1 MG/DL (ref 8.7–10.5)
CHLORIDE SERPL-SCNC: 99 MMOL/L (ref 95–110)
CO2 SERPL-SCNC: 23 MMOL/L (ref 23–29)
CREAT SERPL-MCNC: 0.9 MG/DL (ref 0.5–1.4)
EST. GFR  (NO RACE VARIABLE): >60 ML/MIN/1.73 M^2
ESTIMATED AVG GLUCOSE: 120 MG/DL (ref 68–131)
GLUCOSE SERPL-MCNC: 96 MG/DL (ref 70–110)
HBA1C MFR BLD: 5.8 % (ref 4–5.6)
POTASSIUM SERPL-SCNC: 4.3 MMOL/L (ref 3.5–5.1)
SODIUM SERPL-SCNC: 133 MMOL/L (ref 136–145)

## 2022-12-01 PROCEDURE — 99213 OFFICE O/P EST LOW 20 MIN: CPT | Mod: S$GLB,,, | Performed by: INTERNAL MEDICINE

## 2022-12-01 PROCEDURE — 36415 COLL VENOUS BLD VENIPUNCTURE: CPT | Mod: PO | Performed by: INTERNAL MEDICINE

## 2022-12-01 PROCEDURE — 1159F MED LIST DOCD IN RCRD: CPT | Mod: CPTII,S$GLB,, | Performed by: INTERNAL MEDICINE

## 2022-12-01 PROCEDURE — 3079F PR MOST RECENT DIASTOLIC BLOOD PRESSURE 80-89 MM HG: ICD-10-PCS | Mod: CPTII,S$GLB,, | Performed by: INTERNAL MEDICINE

## 2022-12-01 PROCEDURE — 80048 BASIC METABOLIC PNL TOTAL CA: CPT | Performed by: INTERNAL MEDICINE

## 2022-12-01 PROCEDURE — 3044F PR MOST RECENT HEMOGLOBIN A1C LEVEL <7.0%: ICD-10-PCS | Mod: CPTII,S$GLB,, | Performed by: INTERNAL MEDICINE

## 2022-12-01 PROCEDURE — 1160F PR REVIEW ALL MEDS BY PRESCRIBER/CLIN PHARMACIST DOCUMENTED: ICD-10-PCS | Mod: CPTII,S$GLB,, | Performed by: INTERNAL MEDICINE

## 2022-12-01 PROCEDURE — 83036 HEMOGLOBIN GLYCOSYLATED A1C: CPT | Performed by: INTERNAL MEDICINE

## 2022-12-01 PROCEDURE — 3074F PR MOST RECENT SYSTOLIC BLOOD PRESSURE < 130 MM HG: ICD-10-PCS | Mod: CPTII,S$GLB,, | Performed by: INTERNAL MEDICINE

## 2022-12-01 PROCEDURE — 3074F SYST BP LT 130 MM HG: CPT | Mod: CPTII,S$GLB,, | Performed by: INTERNAL MEDICINE

## 2022-12-01 PROCEDURE — 4010F PR ACE/ARB THEARPY RXD/TAKEN: ICD-10-PCS | Mod: CPTII,S$GLB,, | Performed by: INTERNAL MEDICINE

## 2022-12-01 PROCEDURE — 4010F ACE/ARB THERAPY RXD/TAKEN: CPT | Mod: CPTII,S$GLB,, | Performed by: INTERNAL MEDICINE

## 2022-12-01 PROCEDURE — 1159F PR MEDICATION LIST DOCUMENTED IN MEDICAL RECORD: ICD-10-PCS | Mod: CPTII,S$GLB,, | Performed by: INTERNAL MEDICINE

## 2022-12-01 PROCEDURE — 3044F HG A1C LEVEL LT 7.0%: CPT | Mod: CPTII,S$GLB,, | Performed by: INTERNAL MEDICINE

## 2022-12-01 PROCEDURE — 99999 PR PBB SHADOW E&M-EST. PATIENT-LVL IV: CPT | Mod: PBBFAC,,, | Performed by: INTERNAL MEDICINE

## 2022-12-01 PROCEDURE — 3079F DIAST BP 80-89 MM HG: CPT | Mod: CPTII,S$GLB,, | Performed by: INTERNAL MEDICINE

## 2022-12-01 PROCEDURE — 99999 PR PBB SHADOW E&M-EST. PATIENT-LVL IV: ICD-10-PCS | Mod: PBBFAC,,, | Performed by: INTERNAL MEDICINE

## 2022-12-01 PROCEDURE — 3008F BODY MASS INDEX DOCD: CPT | Mod: CPTII,S$GLB,, | Performed by: INTERNAL MEDICINE

## 2022-12-01 PROCEDURE — 3008F PR BODY MASS INDEX (BMI) DOCUMENTED: ICD-10-PCS | Mod: CPTII,S$GLB,, | Performed by: INTERNAL MEDICINE

## 2022-12-01 PROCEDURE — 1160F RVW MEDS BY RX/DR IN RCRD: CPT | Mod: CPTII,S$GLB,, | Performed by: INTERNAL MEDICINE

## 2022-12-01 PROCEDURE — 99213 PR OFFICE/OUTPT VISIT, EST, LEVL III, 20-29 MIN: ICD-10-PCS | Mod: S$GLB,,, | Performed by: INTERNAL MEDICINE

## 2022-12-01 RX ORDER — LOSARTAN POTASSIUM 25 MG/1
25 TABLET ORAL DAILY
Qty: 90 TABLET | Refills: 0 | Status: SHIPPED | OUTPATIENT
Start: 2022-12-01 | End: 2023-03-21 | Stop reason: SDUPTHER

## 2022-12-01 RX ORDER — VENLAFAXINE HYDROCHLORIDE 150 MG/1
CAPSULE, EXTENDED RELEASE ORAL
Qty: 90 CAPSULE | Refills: 1 | Status: SHIPPED | OUTPATIENT
Start: 2022-12-01 | End: 2023-03-21 | Stop reason: SDUPTHER

## 2022-12-01 NOTE — PROGRESS NOTES
Subjective:     Kady Mares is a 37 y.o. female who presents for   Chief Complaint   Patient presents with    weight problem     Wants to discuss weight and blood pressure.     Hypertension    Pre-diabetes       HPI    Hypertension: The patient has been taking medications as instructed, no medication side effects noted, no chest pain on exertion, no dyspnea on exertion, and no swelling of ankles. She is annoyed by the frequent urination with HCTZ.    BP Readings from Last 3 Encounters:   12/01/22 128/88   03/18/22 (!) 151/105   03/11/22 (!) 173/94       Pre-diabetes: Patient has a family history of diabetes. The patient has/has not make any changes to diet due to busy schedule. She has never taken medications to reduce the risk for progression to diabetes. She denies excessive urination, no excessive thirst and no excessive hunger.     Lab Results   Component Value Date    HGBA1C 5.8 (H) 12/01/2022    GLU 96 12/01/2022     Body mass index is 40.6 kg/m².  Wt Readings from Last 3 Encounters:   12/01/22 100.7 kg (222 lb)   03/18/22 103.1 kg (227 lb 4.7 oz)   03/11/22 102.1 kg (225 lb)   - walks her dog sometimes  - poor diet choices at times due to work schedule    She previously was seeing a psychiatry provider and she has a stable regimen. She would like me to ;prescribe her anxiety medications.      Review of Systems   Constitutional:  Negative for chills, diaphoresis and fever.   HENT:  Negative for congestion, ear pain and sinus pressure.    Respiratory:  Negative for cough and shortness of breath.    Cardiovascular:  Negative for chest pain and palpitations.   Gastrointestinal:  Negative for abdominal pain, constipation, diarrhea, nausea and vomiting.   Endocrine: Negative for polydipsia and polyuria.   Musculoskeletal:  Negative for arthralgias and myalgias.   Skin:  Negative for rash and wound.   Neurological:  Negative for dizziness, tremors, numbness and headaches.    Psychiatric/Behavioral:  Negative for dysphoric mood. The patient is not nervous/anxious.         Mood is stable         Objective:     Physical Exam  Vitals reviewed.   Constitutional:       General: She is awake. She is not in acute distress.     Appearance: Normal appearance. She is well-developed and well-groomed.   HENT:      Head: Normocephalic and atraumatic.      Right Ear: Hearing and external ear normal.      Left Ear: Hearing and external ear normal.      Nose: Nose normal. No congestion.   Eyes:      General: Lids are normal. Vision grossly intact.   Cardiovascular:      Rate and Rhythm: Normal rate and regular rhythm.      Heart sounds: Normal heart sounds. No murmur heard.  Pulmonary:      Effort: Pulmonary effort is normal.      Breath sounds: Normal breath sounds. No decreased breath sounds or wheezing.   Abdominal:      General: Bowel sounds are normal. There is no distension.   Musculoskeletal:         General: Normal range of motion.      Cervical back: Normal range of motion.      Right lower leg: No edema.      Left lower leg: No edema.   Skin:     General: Skin is warm and dry.      Findings: No lesion or rash.   Neurological:      Mental Status: She is alert and oriented to person, place, and time.   Psychiatric:         Attention and Perception: Attention normal.         Mood and Affect: Mood normal.         Behavior: Behavior is cooperative.          Assessment:      1. Essential hypertension    2. Prediabetes    3. Generalized anxiety disorder           Plan:     1. Essential hypertension  - will stop HCTZ due to excessive urination, start losartan  - losartan (COZAAR) 25 MG tablet; Take 1 tablet (25 mg total) by mouth once daily.  Dispense: 90 tablet; Refill: 0  - Basic Metabolic Panel; Future  - monitor BP readings closely and call us with readings    2. Prediabetes  - Hemoglobin A1C; Future  - discussed a few healthier and fast options for meals at work    3. Generalized anxiety  disorder  - stable, continue effexor and PRN propranolol  -  venlafaxine (EFFEXOR-XR) 150 MG Cp24; Take 1 capsule by mouth every morning  Dispense: 90 capsule; Refill: 1    RTC in March for annual exam or sooner if needed    __________________________    Diamond Vasquez MD, PharmD  [facility]- Internal Medicine  American Board of Obesity Medicine diplomate  Office [phone]

## 2022-12-04 DIAGNOSIS — E55.9 VITAMIN D INSUFFICIENCY: ICD-10-CM

## 2022-12-04 DIAGNOSIS — I10 ESSENTIAL HYPERTENSION: ICD-10-CM

## 2022-12-04 DIAGNOSIS — Z00.00 ANNUAL PHYSICAL EXAM: Primary | ICD-10-CM

## 2022-12-05 ENCOUNTER — TELEPHONE (OUTPATIENT)
Dept: INTERNAL MEDICINE | Facility: CLINIC | Age: 37
End: 2022-12-05
Payer: COMMERCIAL

## 2022-12-05 NOTE — TELEPHONE ENCOUNTER
----- Message from Diamond Vasquez MD sent at 12/4/2022  2:06 AM CST -----  >>> please schedule labs before her annual exam in March

## 2022-12-05 NOTE — TELEPHONE ENCOUNTER
Lt voice message for pt to return call regarding getting pt schedule labs prior to Annual visit March 21,2023.

## 2023-03-13 ENCOUNTER — OFFICE VISIT (OUTPATIENT)
Dept: OBSTETRICS AND GYNECOLOGY | Facility: CLINIC | Age: 38
End: 2023-03-13
Payer: COMMERCIAL

## 2023-03-13 VITALS
DIASTOLIC BLOOD PRESSURE: 88 MMHG | BODY MASS INDEX: 41.41 KG/M2 | SYSTOLIC BLOOD PRESSURE: 140 MMHG | WEIGHT: 226.44 LBS

## 2023-03-13 DIAGNOSIS — Z12.4 SCREENING FOR CERVICAL CANCER: ICD-10-CM

## 2023-03-13 DIAGNOSIS — Z30.431 IUD CHECK UP: Primary | ICD-10-CM

## 2023-03-13 PROCEDURE — 3079F PR MOST RECENT DIASTOLIC BLOOD PRESSURE 80-89 MM HG: ICD-10-PCS | Mod: CPTII,S$GLB,, | Performed by: OBSTETRICS & GYNECOLOGY

## 2023-03-13 PROCEDURE — 1160F PR REVIEW ALL MEDS BY PRESCRIBER/CLIN PHARMACIST DOCUMENTED: ICD-10-PCS | Mod: CPTII,S$GLB,, | Performed by: OBSTETRICS & GYNECOLOGY

## 2023-03-13 PROCEDURE — 99999 PR PBB SHADOW E&M-EST. PATIENT-LVL III: CPT | Mod: PBBFAC,,, | Performed by: OBSTETRICS & GYNECOLOGY

## 2023-03-13 PROCEDURE — 1159F PR MEDICATION LIST DOCUMENTED IN MEDICAL RECORD: ICD-10-PCS | Mod: CPTII,S$GLB,, | Performed by: OBSTETRICS & GYNECOLOGY

## 2023-03-13 PROCEDURE — 1160F RVW MEDS BY RX/DR IN RCRD: CPT | Mod: CPTII,S$GLB,, | Performed by: OBSTETRICS & GYNECOLOGY

## 2023-03-13 PROCEDURE — 3077F SYST BP >= 140 MM HG: CPT | Mod: CPTII,S$GLB,, | Performed by: OBSTETRICS & GYNECOLOGY

## 2023-03-13 PROCEDURE — 3077F PR MOST RECENT SYSTOLIC BLOOD PRESSURE >= 140 MM HG: ICD-10-PCS | Mod: CPTII,S$GLB,, | Performed by: OBSTETRICS & GYNECOLOGY

## 2023-03-13 PROCEDURE — 3008F BODY MASS INDEX DOCD: CPT | Mod: CPTII,S$GLB,, | Performed by: OBSTETRICS & GYNECOLOGY

## 2023-03-13 PROCEDURE — 99999 PR PBB SHADOW E&M-EST. PATIENT-LVL III: ICD-10-PCS | Mod: PBBFAC,,, | Performed by: OBSTETRICS & GYNECOLOGY

## 2023-03-13 PROCEDURE — 3079F DIAST BP 80-89 MM HG: CPT | Mod: CPTII,S$GLB,, | Performed by: OBSTETRICS & GYNECOLOGY

## 2023-03-13 PROCEDURE — 1159F MED LIST DOCD IN RCRD: CPT | Mod: CPTII,S$GLB,, | Performed by: OBSTETRICS & GYNECOLOGY

## 2023-03-13 PROCEDURE — 99213 OFFICE O/P EST LOW 20 MIN: CPT | Mod: S$GLB,,, | Performed by: OBSTETRICS & GYNECOLOGY

## 2023-03-13 PROCEDURE — 3008F PR BODY MASS INDEX (BMI) DOCUMENTED: ICD-10-PCS | Mod: CPTII,S$GLB,, | Performed by: OBSTETRICS & GYNECOLOGY

## 2023-03-13 PROCEDURE — 99213 PR OFFICE/OUTPT VISIT, EST, LEVL III, 20-29 MIN: ICD-10-PCS | Mod: S$GLB,,, | Performed by: OBSTETRICS & GYNECOLOGY

## 2023-03-13 PROCEDURE — 87624 HPV HI-RISK TYP POOLED RSLT: CPT | Performed by: OBSTETRICS & GYNECOLOGY

## 2023-03-13 PROCEDURE — 88142 CYTOPATH C/V THIN LAYER: CPT | Performed by: OBSTETRICS & GYNECOLOGY

## 2023-03-13 NOTE — PROGRESS NOTES
GYNECOLOGY OFFICE NOTE    Reason for visit: IUD check    HPI: Pt is a 38 y.o.  female  who presents for IUD check- desires IUD strings to be trimmed. Will be due for pap in Dec. Will perform today. No cycles with IUD.      Past Medical History:   Diagnosis Date    Mild depression 2018    Nephrolithiasis     Vertigo        Past Surgical History:   Procedure Laterality Date    MYOMECTOMY  2015       Family History   Problem Relation Age of Onset    Diabetes Mother     Hypertension Mother     Heart disease Mother     Pacemaker/defibrilator Mother     Diabetes Father     Hypertension Father     Prostate cancer Father     Kidney cancer Father     Breast cancer Neg Hx     Colon cancer Neg Hx     Ovarian cancer Neg Hx        Social History     Tobacco Use    Smoking status: Never    Smokeless tobacco: Never   Substance Use Topics    Alcohol use: No     Alcohol/week: 0.0 standard drinks    Drug use: No       OB History    Para Term  AB Living   0 0 0 0 0 0   SAB IAB Ectopic Multiple Live Births   0 0 0 0         Current Outpatient Medications   Medication Sig    losartan (COZAAR) 25 MG tablet Take 1 tablet (25 mg total) by mouth once daily.    propranoloL (INDERAL) 10 MG tablet Take 1 tablet by mouth daily as needed for anxiety.    venlafaxine (EFFEXOR-XR) 150 MG Cp24 Take 1 capsule by mouth every morning    ciclopirox (PENLAC) 8 % Soln Apply to nail and nail fold once daily for up to 1 year (Patient not taking: Reported on 2022)    efinaconazole (JUBLIA) 10 % Terrie Apply once daily to nail and nail fold.  Use daily for up to 1 year. (Patient not taking: Reported on 2022)     No current facility-administered medications for this visit.       Allergies: Wellbutrin [bupropion hcl]     BP (!) 140/88   Wt 102.7 kg (226 lb 6.6 oz)   LMP 2023 (Exact Date)   BMI 41.41 kg/m²     ROS:  GENERAL: Denies fever or chills.   SKIN: Denies rash or lesions.   HEAD: Denies head injury  or headache.   CHEST: Denies chest pain or shortness of breath.   CARDIOVASCULAR: Denies palpitations or chest pain.   ABDOMEN: No constipation, diarrhea, nausea, vomiting or rectal bleeding.   URINARY: No dysuria, hematuria, or burning on urination.  REPRODUCTIVE: See HPI.   BREASTS: see HPI  NEUROLOGIC: Denies syncope or weakness.     Physical Exam:  GENERAL: alert, appears stated age and cooperative  NEUROLOGIC: orientated to person, place and time, normal mood and affect   CHEST: Normal respiratory effort  NECK: normal appearance  SKIN: no acne, hirsutism  BREAST EXAM: not examined  ABDOMEN: abdomen is soft without significant tenderness, masses  EXTERNAL GENITALIA:  normal general appearance  URETHRA: normal urethra, normal urethral meatus  VAGINA:  normal mucosa, no  lesions  CERVIX:  Normal- IUD strings not visible or palpable. Bedside US confirms intrauterine placement  UTERUS:  mobile, non tender  ADNEXA: nontender    Diagnosis:  1. IUD check up    2. Screening for cervical cancer        Plan:   1. IUD strings not visualized to shorten- given precautions  2. Pap/hpv today    Orders Placed This Encounter    HPV High Risk Genotypes, PCR    Liquid-Based Pap Smear, Screening             Face to Face time with patient: 20 minutes of total time spent on the encounter, which includes face to face time and non-face to face time preparing to see the patient (eg, review of tests), Obtaining and/or reviewing separately obtained history, Documenting clinical information in the electronic or other health record, Independently interpreting results (not separately reported) and communicating results to the patient/family/caregiver, or Care coordination (not separately reported).         Emelina Bay MD  OB/GYN

## 2023-03-16 LAB
HPV HR 12 DNA SPEC QL NAA+PROBE: NEGATIVE
HPV16 AG SPEC QL: NEGATIVE
HPV18 DNA SPEC QL NAA+PROBE: NEGATIVE

## 2023-03-20 LAB
FINAL PATHOLOGIC DIAGNOSIS: NORMAL
Lab: NORMAL

## 2023-03-21 ENCOUNTER — TELEPHONE (OUTPATIENT)
Dept: INTERNAL MEDICINE | Facility: CLINIC | Age: 38
End: 2023-03-21

## 2023-03-21 ENCOUNTER — TELEPHONE (OUTPATIENT)
Dept: INTERNAL MEDICINE | Facility: CLINIC | Age: 38
End: 2023-03-21
Payer: COMMERCIAL

## 2023-03-21 ENCOUNTER — OFFICE VISIT (OUTPATIENT)
Dept: INTERNAL MEDICINE | Facility: CLINIC | Age: 38
End: 2023-03-21
Payer: COMMERCIAL

## 2023-03-21 ENCOUNTER — LAB VISIT (OUTPATIENT)
Dept: LAB | Facility: HOSPITAL | Age: 38
End: 2023-03-21
Attending: INTERNAL MEDICINE
Payer: COMMERCIAL

## 2023-03-21 VITALS
RESPIRATION RATE: 18 BRPM | SYSTOLIC BLOOD PRESSURE: 130 MMHG | BODY MASS INDEX: 42.15 KG/M2 | HEART RATE: 84 BPM | HEIGHT: 62 IN | TEMPERATURE: 98 F | DIASTOLIC BLOOD PRESSURE: 82 MMHG | OXYGEN SATURATION: 98 % | WEIGHT: 229.06 LBS

## 2023-03-21 DIAGNOSIS — R73.03 PREDIABETES: ICD-10-CM

## 2023-03-21 DIAGNOSIS — Z00.00 ANNUAL PHYSICAL EXAM: Primary | ICD-10-CM

## 2023-03-21 DIAGNOSIS — E55.9 VITAMIN D INSUFFICIENCY: ICD-10-CM

## 2023-03-21 DIAGNOSIS — Z00.00 ANNUAL PHYSICAL EXAM: ICD-10-CM

## 2023-03-21 DIAGNOSIS — E04.9 PALPABLE THYROID: ICD-10-CM

## 2023-03-21 DIAGNOSIS — F41.1 GENERALIZED ANXIETY DISORDER: ICD-10-CM

## 2023-03-21 DIAGNOSIS — E55.9 VITAMIN D INSUFFICIENCY: Primary | ICD-10-CM

## 2023-03-21 DIAGNOSIS — E66.01 MORBID OBESITY WITH BMI OF 40.0-44.9, ADULT: ICD-10-CM

## 2023-03-21 DIAGNOSIS — I10 ESSENTIAL HYPERTENSION: ICD-10-CM

## 2023-03-21 LAB
25(OH)D3+25(OH)D2 SERPL-MCNC: 11 NG/ML (ref 30–96)
ALBUMIN SERPL BCP-MCNC: 3.7 G/DL (ref 3.5–5.2)
ALP SERPL-CCNC: 78 U/L (ref 55–135)
ALT SERPL W/O P-5'-P-CCNC: 16 U/L (ref 10–44)
ANION GAP SERPL CALC-SCNC: 7 MMOL/L (ref 8–16)
AST SERPL-CCNC: 16 U/L (ref 10–40)
BASOPHILS # BLD AUTO: 0.04 K/UL (ref 0–0.2)
BASOPHILS NFR BLD: 0.5 % (ref 0–1.9)
BILIRUB SERPL-MCNC: 0.2 MG/DL (ref 0.1–1)
BUN SERPL-MCNC: 12 MG/DL (ref 6–20)
CALCIUM SERPL-MCNC: 9.6 MG/DL (ref 8.7–10.5)
CHLORIDE SERPL-SCNC: 107 MMOL/L (ref 95–110)
CHOLEST SERPL-MCNC: 194 MG/DL (ref 120–199)
CHOLEST/HDLC SERPL: 3.7 {RATIO} (ref 2–5)
CO2 SERPL-SCNC: 26 MMOL/L (ref 23–29)
CREAT SERPL-MCNC: 0.9 MG/DL (ref 0.5–1.4)
DIFFERENTIAL METHOD: ABNORMAL
EOSINOPHIL # BLD AUTO: 0.1 K/UL (ref 0–0.5)
EOSINOPHIL NFR BLD: 1 % (ref 0–8)
ERYTHROCYTE [DISTWIDTH] IN BLOOD BY AUTOMATED COUNT: 14.1 % (ref 11.5–14.5)
EST. GFR  (NO RACE VARIABLE): >60 ML/MIN/1.73 M^2
GLUCOSE SERPL-MCNC: 106 MG/DL (ref 70–110)
HCT VFR BLD AUTO: 37.5 % (ref 37–48.5)
HDLC SERPL-MCNC: 52 MG/DL (ref 40–75)
HDLC SERPL: 26.8 % (ref 20–50)
HGB BLD-MCNC: 12.1 G/DL (ref 12–16)
IMM GRANULOCYTES # BLD AUTO: 0.02 K/UL (ref 0–0.04)
IMM GRANULOCYTES NFR BLD AUTO: 0.3 % (ref 0–0.5)
LDLC SERPL CALC-MCNC: 128.4 MG/DL (ref 63–159)
LYMPHOCYTES # BLD AUTO: 3 K/UL (ref 1–4.8)
LYMPHOCYTES NFR BLD: 38.5 % (ref 18–48)
MCH RBC QN AUTO: 30.6 PG (ref 27–31)
MCHC RBC AUTO-ENTMCNC: 32.3 G/DL (ref 32–36)
MCV RBC AUTO: 95 FL (ref 82–98)
MONOCYTES # BLD AUTO: 0.5 K/UL (ref 0.3–1)
MONOCYTES NFR BLD: 6.5 % (ref 4–15)
NEUTROPHILS # BLD AUTO: 4.2 K/UL (ref 1.8–7.7)
NEUTROPHILS NFR BLD: 53.2 % (ref 38–73)
NONHDLC SERPL-MCNC: 142 MG/DL
NRBC BLD-RTO: 0 /100 WBC
PLATELET # BLD AUTO: 296 K/UL (ref 150–450)
PMV BLD AUTO: 11.1 FL (ref 9.2–12.9)
POTASSIUM SERPL-SCNC: 4 MMOL/L (ref 3.5–5.1)
PROT SERPL-MCNC: 7 G/DL (ref 6–8.4)
RBC # BLD AUTO: 3.96 M/UL (ref 4–5.4)
SODIUM SERPL-SCNC: 140 MMOL/L (ref 136–145)
TRIGL SERPL-MCNC: 68 MG/DL (ref 30–150)
TSH SERPL DL<=0.005 MIU/L-ACNC: 1.64 UIU/ML (ref 0.4–4)
WBC # BLD AUTO: 7.9 K/UL (ref 3.9–12.7)

## 2023-03-21 PROCEDURE — 3008F PR BODY MASS INDEX (BMI) DOCUMENTED: ICD-10-PCS | Mod: CPTII,S$GLB,, | Performed by: INTERNAL MEDICINE

## 2023-03-21 PROCEDURE — 3079F PR MOST RECENT DIASTOLIC BLOOD PRESSURE 80-89 MM HG: ICD-10-PCS | Mod: CPTII,S$GLB,, | Performed by: INTERNAL MEDICINE

## 2023-03-21 PROCEDURE — 4010F PR ACE/ARB THEARPY RXD/TAKEN: ICD-10-PCS | Mod: CPTII,S$GLB,, | Performed by: INTERNAL MEDICINE

## 2023-03-21 PROCEDURE — 3075F SYST BP GE 130 - 139MM HG: CPT | Mod: CPTII,S$GLB,, | Performed by: INTERNAL MEDICINE

## 2023-03-21 PROCEDURE — 82306 VITAMIN D 25 HYDROXY: CPT | Performed by: INTERNAL MEDICINE

## 2023-03-21 PROCEDURE — 1160F PR REVIEW ALL MEDS BY PRESCRIBER/CLIN PHARMACIST DOCUMENTED: ICD-10-PCS | Mod: CPTII,S$GLB,, | Performed by: INTERNAL MEDICINE

## 2023-03-21 PROCEDURE — 99999 PR PBB SHADOW E&M-EST. PATIENT-LVL IV: CPT | Mod: PBBFAC,,, | Performed by: INTERNAL MEDICINE

## 2023-03-21 PROCEDURE — 80061 LIPID PANEL: CPT | Performed by: INTERNAL MEDICINE

## 2023-03-21 PROCEDURE — 3008F BODY MASS INDEX DOCD: CPT | Mod: CPTII,S$GLB,, | Performed by: INTERNAL MEDICINE

## 2023-03-21 PROCEDURE — 3079F DIAST BP 80-89 MM HG: CPT | Mod: CPTII,S$GLB,, | Performed by: INTERNAL MEDICINE

## 2023-03-21 PROCEDURE — 1159F PR MEDICATION LIST DOCUMENTED IN MEDICAL RECORD: ICD-10-PCS | Mod: CPTII,S$GLB,, | Performed by: INTERNAL MEDICINE

## 2023-03-21 PROCEDURE — 1159F MED LIST DOCD IN RCRD: CPT | Mod: CPTII,S$GLB,, | Performed by: INTERNAL MEDICINE

## 2023-03-21 PROCEDURE — 99395 PR PREVENTIVE VISIT,EST,18-39: ICD-10-PCS | Mod: S$GLB,,, | Performed by: INTERNAL MEDICINE

## 2023-03-21 PROCEDURE — 36415 COLL VENOUS BLD VENIPUNCTURE: CPT | Mod: PO | Performed by: INTERNAL MEDICINE

## 2023-03-21 PROCEDURE — 85025 COMPLETE CBC W/AUTO DIFF WBC: CPT | Performed by: INTERNAL MEDICINE

## 2023-03-21 PROCEDURE — 99999 PR PBB SHADOW E&M-EST. PATIENT-LVL IV: ICD-10-PCS | Mod: PBBFAC,,, | Performed by: INTERNAL MEDICINE

## 2023-03-21 PROCEDURE — 3075F PR MOST RECENT SYSTOLIC BLOOD PRESS GE 130-139MM HG: ICD-10-PCS | Mod: CPTII,S$GLB,, | Performed by: INTERNAL MEDICINE

## 2023-03-21 PROCEDURE — 80053 COMPREHEN METABOLIC PANEL: CPT | Performed by: INTERNAL MEDICINE

## 2023-03-21 PROCEDURE — 4010F ACE/ARB THERAPY RXD/TAKEN: CPT | Mod: CPTII,S$GLB,, | Performed by: INTERNAL MEDICINE

## 2023-03-21 PROCEDURE — 99395 PREV VISIT EST AGE 18-39: CPT | Mod: S$GLB,,, | Performed by: INTERNAL MEDICINE

## 2023-03-21 PROCEDURE — 84443 ASSAY THYROID STIM HORMONE: CPT | Performed by: INTERNAL MEDICINE

## 2023-03-21 PROCEDURE — 1160F RVW MEDS BY RX/DR IN RCRD: CPT | Mod: CPTII,S$GLB,, | Performed by: INTERNAL MEDICINE

## 2023-03-21 RX ORDER — VENLAFAXINE HYDROCHLORIDE 150 MG/1
CAPSULE, EXTENDED RELEASE ORAL
Qty: 90 CAPSULE | Refills: 3 | Status: SHIPPED | OUTPATIENT
Start: 2023-03-21 | End: 2024-03-28 | Stop reason: SDUPTHER

## 2023-03-21 RX ORDER — PROPRANOLOL HYDROCHLORIDE 10 MG/1
TABLET ORAL
Qty: 30 TABLET | Refills: 2 | Status: SHIPPED | OUTPATIENT
Start: 2023-03-21

## 2023-03-21 RX ORDER — ERGOCALCIFEROL 1.25 MG/1
50000 CAPSULE ORAL
Qty: 12 CAPSULE | Refills: 3 | Status: SHIPPED | OUTPATIENT
Start: 2023-03-21 | End: 2024-03-07 | Stop reason: SDUPTHER

## 2023-03-21 RX ORDER — LOSARTAN POTASSIUM 25 MG/1
25 TABLET ORAL DAILY
Qty: 90 TABLET | Refills: 0 | Status: SHIPPED | OUTPATIENT
Start: 2023-03-21 | End: 2024-03-07 | Stop reason: ALTCHOICE

## 2023-03-21 RX ORDER — METRONIDAZOLE 500 MG/1
500 TABLET ORAL EVERY 12 HOURS
Qty: 14 TABLET | Refills: 0 | Status: SHIPPED | OUTPATIENT
Start: 2023-03-21 | End: 2023-03-21 | Stop reason: SDUPTHER

## 2023-03-21 NOTE — PROGRESS NOTES
Subjective:     PCP: MD Kady Gilliland Maria Elena Mares is a 38 y.o. female who presents for an annual exam.    Medical History:   Past Medical History:   Diagnosis Date    Mild depression 11/2/2018    Nephrolithiasis     Vertigo        Family History: family history includes Diabetes in her father and mother; Heart disease in her mother; Hypertension in her father and mother; Kidney cancer in her father; Pacemaker/defibrilator in her mother; Prostate cancer in her father.    Surgical History:   Past Surgical History:   Procedure Laterality Date    MYOMECTOMY  09/22/2015        Social History:  reports that she has never smoked. She has never used smokeless tobacco. She reports that she does not drink alcohol and does not use drugs.     Allergies:   Review of patient's allergies indicates:   Allergen Reactions    Wellbutrin [bupropion hcl] Itching       Medications:   Current Outpatient Medications   Medication Sig    losartan (COZAAR) 25 MG tablet Take 1 tablet (25 mg total) by mouth once daily.    metroNIDAZOLE (FLAGYL) 500 MG tablet Take 1 tablet (500 mg total) by mouth every 12 (twelve) hours. Do not take with alcohol for 7 days    propranoloL (INDERAL) 10 MG tablet Take 1 tablet by mouth daily as needed for anxiety.    venlafaxine (EFFEXOR-XR) 150 MG Cp24 Take 1 capsule by mouth every morning     No current facility-administered medications for this visit.       Health Maintenance:   Health Maintenance Topics with due status: Not Due       Topic Last Completion Date    TETANUS VACCINE 03/18/2022    Hemoglobin A1c (Prediabetes) 12/01/2022    Cervical Cancer Screening 03/13/2023     Lab Results   Component Value Date    HEPCAB Negative 03/18/2022    [id number] Negative 03/18/2022     Eye Exam:   glasses, yearly  Dental Exam: due  OB/GYN: Dr. Bay  Pap smear: 3/20/2023  HIV: 3/2022, neg  Hepatitic C  Ab: 3/2022, neg    Vaccinations:  Immunization History   Administered Date(s) Administered     COVID-19, MRNA, LN-S, PF (MODERNA FULL 0.5 ML DOSE) 06/17/2021, 07/20/2021, 01/16/2022    Influenza - Quadrivalent - PF *Preferred* (6 months and older) 10/06/2022    Td - PF (ADULT) 03/18/2022     Influenza: @work  Tetanus: 2022  Covid vaccine: booster due    Body mass index is 41.9 kg/m².  Wt Readings from Last 3 Encounters:   03/21/23 103.9 kg (229 lb 0.9 oz)   03/13/23 102.7 kg (226 lb 6.6 oz)   12/01/22 100.7 kg (222 lb)       Review of Systems   Constitutional:  Negative for chills, diaphoresis, fatigue and fever.   HENT:  Negative for congestion, dental problem, ear discharge, ear pain, postnasal drip, rhinorrhea, sinus pressure, sore throat and trouble swallowing.    Eyes:  Negative for redness and visual disturbance.   Respiratory:  Negative for cough, chest tightness and shortness of breath.    Cardiovascular:  Negative for chest pain, palpitations and leg swelling.   Gastrointestinal:  Negative for abdominal pain, blood in stool, constipation, diarrhea, nausea and vomiting.   Endocrine: Positive for heat intolerance. Negative for cold intolerance, polydipsia and polyuria.   Genitourinary:  Negative for decreased urine volume, dysuria, frequency and hematuria.   Musculoskeletal:  Negative for arthralgias, back pain and myalgias.   Skin:  Negative for rash and wound.   Neurological:  Negative for dizziness, weakness, numbness and headaches.   Hematological:  Negative for adenopathy.   Psychiatric/Behavioral:  Negative for dysphoric mood and sleep disturbance. The patient is nervous/anxious (occasionally, uses propranolol at times, used for 1 stressful week).         Objective:     Physical Exam  Vitals reviewed.   Constitutional:       General: She is awake. She is not in acute distress.     Appearance: Normal appearance. She is well-developed and well-groomed. She is not diaphoretic.   HENT:      Head: Normocephalic and atraumatic.      Right Ear: Hearing, tympanic membrane, ear canal and external ear  normal. Tympanic membrane is not erythematous or bulging.      Left Ear: Hearing, tympanic membrane, ear canal and external ear normal. Tympanic membrane is not erythematous or bulging.      Nose: Nose normal. No congestion.      Mouth/Throat:      Mouth: Mucous membranes are moist.      Tongue: No lesions.      Pharynx: Oropharynx is clear. Uvula midline. No oropharyngeal exudate or posterior oropharyngeal erythema.   Eyes:      General: Lids are normal. Vision grossly intact. Gaze aligned appropriately. No scleral icterus.     Conjunctiva/sclera:      Right eye: Right conjunctiva is not injected.      Left eye: Left conjunctiva is not injected.      Pupils: Pupils are equal, round, and reactive to light.   Neck:      Thyroid: No thyroid mass. Thyromegaly: palpable R>L.  Cardiovascular:      Rate and Rhythm: Normal rate and regular rhythm.      Pulses: Normal pulses.      Heart sounds: Normal heart sounds. No murmur heard.  Pulmonary:      Effort: Pulmonary effort is normal. No respiratory distress.      Breath sounds: Normal breath sounds. No decreased breath sounds or wheezing.   Abdominal:      General: Bowel sounds are normal. There is no distension.      Palpations: Abdomen is soft. Abdomen is not rigid.      Tenderness: There is no abdominal tenderness. There is no guarding or rebound.   Musculoskeletal:         General: Normal range of motion.      Cervical back: Normal range of motion and neck supple.      Right lower leg: No edema.      Left lower leg: No edema.   Lymphadenopathy:      Cervical: No cervical adenopathy.      Upper Body:      Right upper body: No supraclavicular adenopathy.      Left upper body: No supraclavicular adenopathy.   Skin:     General: Skin is warm and dry.      Coloration: Skin is not cyanotic.      Findings: No lesion or rash.      Nails: There is no clubbing.   Neurological:      General: No focal deficit present.      Mental Status: She is alert and oriented to person, place,  and time.      Sensory: Sensation is intact.      Coordination: Coordination is intact.      Gait: Gait is intact.      Deep Tendon Reflexes: Reflexes are normal and symmetric.   Psychiatric:         Attention and Perception: Attention normal.         Mood and Affect: Mood normal.         Behavior: Behavior is cooperative.            Assessment:        1. Annual physical exam    2. Essential hypertension    3. Generalized anxiety disorder    4. Prediabetes    5. Palpable thyroid    6. Morbid obesity with BMI of 40.0-44.9, adult           Plan:     1. Annual physical exam  - reviewed recent labs with patient    2. Essential hypertension  - elevated at times but she does not check BP every day  - will start taking it daily and monitor BP for the next week, call us with readings  - losartan (COZAAR) 25 MG tablet; Take 1 tablet (25 mg total) by mouth once daily.  Dispense: 90 tablet; Refill: 0    3. Generalized anxiety disorder  - controlled with effexor and PRN propranolol  - venlafaxine (EFFEXOR-XR) 150 MG Cp24; Take 1 capsule by mouth every morning  Dispense: 90 capsule; Refill: 3  - propranoloL (INDERAL) 10 MG tablet; Take 1 tablet by mouth daily as needed for anxiety.  Dispense: 30 tablet; Refill: 2    4. Prediabetes  - last HbA1c 5.8, reduce sugar intake    5. Palpable thyroid  - US Soft Tissue Head Neck Thyroid; Future    6. Morbid obesity with BMI of 40.0-44.9, adult  - patient hopes to start planning meals prepared at home, reduce fast food/restaurant food intake    RTC in 6 months for follow-up or sooner if needed    __________________________    Diamond Vasquez MD, PharmD  [facility]  American Board of Obesity Medicine diplomate  Office [phone]

## 2023-03-22 ENCOUNTER — TELEPHONE (OUTPATIENT)
Dept: INTERNAL MEDICINE | Facility: CLINIC | Age: 38
End: 2023-03-22
Payer: COMMERCIAL

## 2023-03-22 NOTE — TELEPHONE ENCOUNTER
----- Message from Diamond Vasquez MD sent at 3/21/2023  3:37 PM CDT -----  The urinalysis is normal.

## 2023-03-23 ENCOUNTER — HOSPITAL ENCOUNTER (OUTPATIENT)
Dept: RADIOLOGY | Facility: HOSPITAL | Age: 38
Discharge: HOME | End: 2023-03-23
Attending: INTERNAL MEDICINE
Payer: COMMERCIAL

## 2023-03-23 DIAGNOSIS — E04.9 PALPABLE THYROID: ICD-10-CM

## 2023-03-23 PROCEDURE — 76536 US EXAM OF HEAD AND NECK: CPT | Mod: 26,,, | Performed by: RADIOLOGY

## 2023-03-23 PROCEDURE — 76536 US SOFT TISSUE HEAD NECK THYROID: ICD-10-PCS | Mod: 26,,, | Performed by: RADIOLOGY

## 2023-03-23 PROCEDURE — 76536 US EXAM OF HEAD AND NECK: CPT | Mod: TC

## 2023-04-18 ENCOUNTER — PATIENT MESSAGE (OUTPATIENT)
Dept: INTERNAL MEDICINE | Facility: CLINIC | Age: 38
End: 2023-04-18
Payer: COMMERCIAL

## 2023-04-18 NOTE — TELEPHONE ENCOUNTER
Please advise Pt requesting KINGS letter for her dog. specifically and include your name, diagnosis and treatment details, and a recommendation for an KINGS to help treat her mental or emotional disability.

## 2023-04-18 NOTE — TELEPHONE ENCOUNTER
The application process requires information from a mental health provider. So the therapist should be able to help.

## 2023-06-16 ENCOUNTER — PATIENT MESSAGE (OUTPATIENT)
Dept: OBSTETRICS AND GYNECOLOGY | Facility: CLINIC | Age: 38
End: 2023-06-16
Payer: COMMERCIAL

## 2024-02-06 ENCOUNTER — CLINICAL SUPPORT (OUTPATIENT)
Dept: OTHER | Facility: CLINIC | Age: 39
End: 2024-02-06

## 2024-02-06 DIAGNOSIS — Z00.8 ENCOUNTER FOR OTHER GENERAL EXAMINATION: ICD-10-CM

## 2024-02-07 VITALS
WEIGHT: 222 LBS | HEIGHT: 62 IN | DIASTOLIC BLOOD PRESSURE: 95 MMHG | BODY MASS INDEX: 40.85 KG/M2 | SYSTOLIC BLOOD PRESSURE: 147 MMHG

## 2024-02-07 DIAGNOSIS — E11.9 TYPE 2 DIABETES MELLITUS WITHOUT COMPLICATION: ICD-10-CM

## 2024-02-07 LAB
GLUCOSE SERPL-MCNC: 91 MG/DL (ref 60–140)
HDLC SERPL-MCNC: 59 MG/DL
POC CHOLESTEROL, LDL (DOCK): 139 MG/DL
POC CHOLESTEROL, TOTAL: 214 MG/DL
TRIGL SERPL-MCNC: 91 MG/DL

## 2024-03-07 ENCOUNTER — OFFICE VISIT (OUTPATIENT)
Dept: INTERNAL MEDICINE | Facility: CLINIC | Age: 39
End: 2024-03-07
Payer: COMMERCIAL

## 2024-03-07 ENCOUNTER — LAB VISIT (OUTPATIENT)
Dept: LAB | Facility: HOSPITAL | Age: 39
End: 2024-03-07
Attending: INTERNAL MEDICINE
Payer: COMMERCIAL

## 2024-03-07 VITALS
TEMPERATURE: 98 F | WEIGHT: 228.63 LBS | DIASTOLIC BLOOD PRESSURE: 90 MMHG | OXYGEN SATURATION: 98 % | SYSTOLIC BLOOD PRESSURE: 158 MMHG | BODY MASS INDEX: 42.07 KG/M2 | RESPIRATION RATE: 18 BRPM | HEIGHT: 62 IN | HEART RATE: 96 BPM

## 2024-03-07 DIAGNOSIS — Z00.00 ANNUAL PHYSICAL EXAM: ICD-10-CM

## 2024-03-07 DIAGNOSIS — E55.9 VITAMIN D INSUFFICIENCY: ICD-10-CM

## 2024-03-07 DIAGNOSIS — Z00.00 ANNUAL PHYSICAL EXAM: Primary | ICD-10-CM

## 2024-03-07 DIAGNOSIS — E11.9 TYPE 2 DIABETES MELLITUS WITHOUT COMPLICATION: ICD-10-CM

## 2024-03-07 DIAGNOSIS — I10 ESSENTIAL HYPERTENSION: Primary | ICD-10-CM

## 2024-03-07 DIAGNOSIS — E66.01 MORBID OBESITY WITH BMI OF 40.0-44.9, ADULT: ICD-10-CM

## 2024-03-07 DIAGNOSIS — Z91.89 AT RISK FOR SLEEP APNEA: ICD-10-CM

## 2024-03-07 DIAGNOSIS — R73.03 PREDIABETES: ICD-10-CM

## 2024-03-07 DIAGNOSIS — F41.9 ANXIETY: ICD-10-CM

## 2024-03-07 LAB
25(OH)D3+25(OH)D2 SERPL-MCNC: 17 NG/ML (ref 30–96)
ALBUMIN SERPL BCP-MCNC: 3.9 G/DL (ref 3.5–5.2)
ALP SERPL-CCNC: 83 U/L (ref 55–135)
ALT SERPL W/O P-5'-P-CCNC: 13 U/L (ref 10–44)
ANION GAP SERPL CALC-SCNC: 10 MMOL/L (ref 8–16)
AST SERPL-CCNC: 15 U/L (ref 10–40)
BASOPHILS # BLD AUTO: 0.06 K/UL (ref 0–0.2)
BASOPHILS NFR BLD: 0.8 % (ref 0–1.9)
BILIRUB SERPL-MCNC: 0.2 MG/DL (ref 0.1–1)
BUN SERPL-MCNC: 16 MG/DL (ref 6–20)
CALCIUM SERPL-MCNC: 10.1 MG/DL (ref 8.7–10.5)
CHLORIDE SERPL-SCNC: 103 MMOL/L (ref 95–110)
CO2 SERPL-SCNC: 26 MMOL/L (ref 23–29)
CREAT SERPL-MCNC: 0.8 MG/DL (ref 0.5–1.4)
DIFFERENTIAL METHOD BLD: ABNORMAL
EOSINOPHIL # BLD AUTO: 0.1 K/UL (ref 0–0.5)
EOSINOPHIL NFR BLD: 1.7 % (ref 0–8)
ERYTHROCYTE [DISTWIDTH] IN BLOOD BY AUTOMATED COUNT: 14.7 % (ref 11.5–14.5)
EST. GFR  (NO RACE VARIABLE): >60 ML/MIN/1.73 M^2
ESTIMATED AVG GLUCOSE: 123 MG/DL (ref 68–131)
ESTIMATED AVG GLUCOSE: 123 MG/DL (ref 68–131)
GLUCOSE SERPL-MCNC: 105 MG/DL (ref 70–110)
HBA1C MFR BLD: 5.9 % (ref 4–5.6)
HBA1C MFR BLD: 5.9 % (ref 4–5.6)
HCT VFR BLD AUTO: 38.2 % (ref 37–48.5)
HGB BLD-MCNC: 12.3 G/DL (ref 12–16)
IMM GRANULOCYTES # BLD AUTO: 0.01 K/UL (ref 0–0.04)
IMM GRANULOCYTES NFR BLD AUTO: 0.1 % (ref 0–0.5)
INSULIN COLLECTION INTERVAL: NORMAL
INSULIN SERPL-ACNC: 13.2 UU/ML
LYMPHOCYTES # BLD AUTO: 3.2 K/UL (ref 1–4.8)
LYMPHOCYTES NFR BLD: 40.8 % (ref 18–48)
MCH RBC QN AUTO: 29.6 PG (ref 27–31)
MCHC RBC AUTO-ENTMCNC: 32.2 G/DL (ref 32–36)
MCV RBC AUTO: 92 FL (ref 82–98)
MONOCYTES # BLD AUTO: 0.6 K/UL (ref 0.3–1)
MONOCYTES NFR BLD: 7.4 % (ref 4–15)
NEUTROPHILS # BLD AUTO: 3.8 K/UL (ref 1.8–7.7)
NEUTROPHILS NFR BLD: 49.2 % (ref 38–73)
NRBC BLD-RTO: 0 /100 WBC
PLATELET # BLD AUTO: 319 K/UL (ref 150–450)
PMV BLD AUTO: 11.5 FL (ref 9.2–12.9)
POTASSIUM SERPL-SCNC: 4 MMOL/L (ref 3.5–5.1)
PROT SERPL-MCNC: 7.6 G/DL (ref 6–8.4)
RBC # BLD AUTO: 4.15 M/UL (ref 4–5.4)
SODIUM SERPL-SCNC: 139 MMOL/L (ref 136–145)
TSH SERPL DL<=0.005 MIU/L-ACNC: 1.7 UIU/ML (ref 0.4–4)
WBC # BLD AUTO: 7.81 K/UL (ref 3.9–12.7)

## 2024-03-07 PROCEDURE — 84443 ASSAY THYROID STIM HORMONE: CPT | Performed by: INTERNAL MEDICINE

## 2024-03-07 PROCEDURE — 1160F RVW MEDS BY RX/DR IN RCRD: CPT | Mod: CPTII,S$GLB,, | Performed by: INTERNAL MEDICINE

## 2024-03-07 PROCEDURE — 3008F BODY MASS INDEX DOCD: CPT | Mod: CPTII,S$GLB,, | Performed by: INTERNAL MEDICINE

## 2024-03-07 PROCEDURE — 3044F HG A1C LEVEL LT 7.0%: CPT | Mod: CPTII,S$GLB,, | Performed by: INTERNAL MEDICINE

## 2024-03-07 PROCEDURE — 82306 VITAMIN D 25 HYDROXY: CPT | Performed by: INTERNAL MEDICINE

## 2024-03-07 PROCEDURE — 99999 PR PBB SHADOW E&M-EST. PATIENT-LVL III: CPT | Mod: PBBFAC,,, | Performed by: INTERNAL MEDICINE

## 2024-03-07 PROCEDURE — 83525 ASSAY OF INSULIN: CPT | Performed by: INTERNAL MEDICINE

## 2024-03-07 PROCEDURE — 80053 COMPREHEN METABOLIC PANEL: CPT | Performed by: INTERNAL MEDICINE

## 2024-03-07 PROCEDURE — 3080F DIAST BP >= 90 MM HG: CPT | Mod: CPTII,S$GLB,, | Performed by: INTERNAL MEDICINE

## 2024-03-07 PROCEDURE — 36415 COLL VENOUS BLD VENIPUNCTURE: CPT | Mod: PO | Performed by: INTERNAL MEDICINE

## 2024-03-07 PROCEDURE — 85025 COMPLETE CBC W/AUTO DIFF WBC: CPT | Performed by: INTERNAL MEDICINE

## 2024-03-07 PROCEDURE — 3077F SYST BP >= 140 MM HG: CPT | Mod: CPTII,S$GLB,, | Performed by: INTERNAL MEDICINE

## 2024-03-07 PROCEDURE — 1159F MED LIST DOCD IN RCRD: CPT | Mod: CPTII,S$GLB,, | Performed by: INTERNAL MEDICINE

## 2024-03-07 PROCEDURE — 83036 HEMOGLOBIN GLYCOSYLATED A1C: CPT | Performed by: INTERNAL MEDICINE

## 2024-03-07 PROCEDURE — 99214 OFFICE O/P EST MOD 30 MIN: CPT | Mod: S$GLB,,, | Performed by: INTERNAL MEDICINE

## 2024-03-07 RX ORDER — MULTIVITAMIN
1 TABLET ORAL DAILY
COMMUNITY

## 2024-03-07 RX ORDER — ERGOCALCIFEROL 1.25 MG/1
50000 CAPSULE ORAL
Qty: 12 CAPSULE | Refills: 3 | Status: SHIPPED | OUTPATIENT
Start: 2024-03-07

## 2024-03-07 RX ORDER — MULTIVIT WITH MINERALS/HERBS
1 TABLET ORAL DAILY
COMMUNITY

## 2024-03-07 RX ORDER — LABETALOL 100 MG/1
100 TABLET, FILM COATED ORAL 2 TIMES DAILY
Qty: 60 TABLET | Refills: 0 | Status: SHIPPED | OUTPATIENT
Start: 2024-03-07 | End: 2024-04-18 | Stop reason: SDUPTHER

## 2024-03-26 ENCOUNTER — PATIENT MESSAGE (OUTPATIENT)
Dept: INTERNAL MEDICINE | Facility: CLINIC | Age: 39
End: 2024-03-26
Payer: COMMERCIAL

## 2024-03-28 DIAGNOSIS — F41.1 GENERALIZED ANXIETY DISORDER: ICD-10-CM

## 2024-03-28 RX ORDER — VENLAFAXINE HYDROCHLORIDE 150 MG/1
CAPSULE, EXTENDED RELEASE ORAL
Qty: 90 CAPSULE | Refills: 1 | Status: SHIPPED | OUTPATIENT
Start: 2024-03-28

## 2024-03-28 NOTE — TELEPHONE ENCOUNTER
No care due was identified.  [facility] Embedded Care Due Messages. Reference number: [id number].   3/28/2024 1:17:54 AM CDT

## 2024-03-28 NOTE — TELEPHONE ENCOUNTER
Refill Routing Note   Medication(s) are not appropriate for processing by [facility] for the following reason(s):        Required vitals abnormal    [facility] action(s):  Defer               Appointments  past 12m or future 3m with PCP    Date Provider   Last Visit   3/7/2024 Diamond Vasquez MD   Next Visit   4/18/2024 Diamond Vasquez MD   ED visits in past 90 days: 0        Note composed:1:33 PM 03/28/2024

## 2024-04-15 ENCOUNTER — PATIENT MESSAGE (OUTPATIENT)
Dept: ADMINISTRATIVE | Facility: OTHER | Age: 39
End: 2024-04-15
Payer: COMMERCIAL

## 2024-04-18 ENCOUNTER — LAB VISIT (OUTPATIENT)
Dept: LAB | Facility: HOSPITAL | Age: 39
End: 2024-04-18
Attending: INTERNAL MEDICINE
Payer: COMMERCIAL

## 2024-04-18 ENCOUNTER — OFFICE VISIT (OUTPATIENT)
Dept: INTERNAL MEDICINE | Facility: CLINIC | Age: 39
End: 2024-04-18
Payer: COMMERCIAL

## 2024-04-18 DIAGNOSIS — I10 ESSENTIAL HYPERTENSION: ICD-10-CM

## 2024-04-18 DIAGNOSIS — E55.9 VITAMIN D INSUFFICIENCY: ICD-10-CM

## 2024-04-18 DIAGNOSIS — E66.01 MORBID OBESITY WITH BMI OF 40.0-44.9, ADULT: Chronic | ICD-10-CM

## 2024-04-18 DIAGNOSIS — M25.50 ARTHRALGIA, UNSPECIFIED JOINT: ICD-10-CM

## 2024-04-18 DIAGNOSIS — Z00.00 ANNUAL PHYSICAL EXAM: Primary | ICD-10-CM

## 2024-04-18 DIAGNOSIS — F41.1 GENERALIZED ANXIETY DISORDER: ICD-10-CM

## 2024-04-18 DIAGNOSIS — R73.03 PREDIABETES: ICD-10-CM

## 2024-04-18 PROCEDURE — 3044F HG A1C LEVEL LT 7.0%: CPT | Mod: CPTII,S$GLB,, | Performed by: INTERNAL MEDICINE

## 2024-04-18 PROCEDURE — 1160F RVW MEDS BY RX/DR IN RCRD: CPT | Mod: CPTII,S$GLB,, | Performed by: INTERNAL MEDICINE

## 2024-04-18 PROCEDURE — 3074F SYST BP LT 130 MM HG: CPT | Mod: CPTII,S$GLB,, | Performed by: INTERNAL MEDICINE

## 2024-04-18 PROCEDURE — 86618 LYME DISEASE ANTIBODY: CPT | Performed by: INTERNAL MEDICINE

## 2024-04-18 PROCEDURE — 3079F DIAST BP 80-89 MM HG: CPT | Mod: CPTII,S$GLB,, | Performed by: INTERNAL MEDICINE

## 2024-04-18 PROCEDURE — 3008F BODY MASS INDEX DOCD: CPT | Mod: CPTII,S$GLB,, | Performed by: INTERNAL MEDICINE

## 2024-04-18 PROCEDURE — 99395 PREV VISIT EST AGE 18-39: CPT | Mod: S$GLB,,, | Performed by: INTERNAL MEDICINE

## 2024-04-18 PROCEDURE — 1159F MED LIST DOCD IN RCRD: CPT | Mod: CPTII,S$GLB,, | Performed by: INTERNAL MEDICINE

## 2024-04-18 PROCEDURE — 36415 COLL VENOUS BLD VENIPUNCTURE: CPT | Mod: PO | Performed by: INTERNAL MEDICINE

## 2024-04-18 PROCEDURE — 99999 PR PBB SHADOW E&M-EST. PATIENT-LVL III: CPT | Mod: PBBFAC,,, | Performed by: INTERNAL MEDICINE

## 2024-04-18 RX ORDER — LABETALOL 200 MG/1
200 TABLET, FILM COATED ORAL 2 TIMES DAILY
Qty: 60 TABLET | Refills: 2 | Status: SHIPPED | OUTPATIENT
Start: 2024-04-18

## 2024-04-18 NOTE — PROGRESS NOTES
Subjective:     PCP: Diamond Vasquez MD Meaghen Elizabeth Maria Elena Mares is a 39 y.o. female who presents for an annual exam.    Medical History:   Past Medical History:   Diagnosis Date    Mild depression 11/02/2018    Multiple thyroid nodules     Nephrolithiasis     Vertigo        Family History: family history includes Diabetes in her father and mother; Heart disease in her mother; Hypertension in her father and mother; Kidney cancer in her father; Pacemaker/defibrilator in her mother; Prostate cancer in her father and paternal uncle.    Surgical History:   Past Surgical History:   Procedure Laterality Date    MYOMECTOMY  09/22/2015        Social History:  reports that she has never smoked. She has never used smokeless tobacco. She reports that she does not drink alcohol and does not use drugs.     Allergies:   Review of patient's allergies indicates:   Allergen Reactions    Wellbutrin [bupropion hcl] Itching       Medications:   Current Outpatient Medications   Medication Sig    b complex vitamins tablet Take 1 tablet by mouth once daily.    Bacillus coagulans/inulin (PROBIOTIC WITH PREBIOTIC ORAL) Take by mouth.    ergocalciferol (ERGOCALCIFEROL) 50,000 unit Cap Take 1 capsule (50,000 Units total) by mouth every 7 days.    multivitamin (THERAGRAN) per tablet Take 1 tablet by mouth once daily.    propranoloL (INDERAL) 10 MG tablet Take 1 tablet by mouth daily as needed for anxiety.    venlafaxine (EFFEXOR-XR) 150 MG Cp24 Take 1 capsule by mouth every morning    labetaloL (NORMODYNE) 200 MG tablet Take 1 tablet (200 mg total) by mouth 2 (two) times daily.     No current facility-administered medications for this visit.       Health Maintenance:   Health Maintenance Topics with due status: Not Due       Topic Last Completion Date    TETANUS VACCINE 03/18/2022    Cervical Cancer Screening 03/13/2023    Hemoglobin A1c (Prediabetes) 03/07/2024     Lab Results   Component Value Date    HEPCAB Negative  03/18/2022    [id number] Negative 03/18/2022     Eye Exam:   >1 year ago, needs appt  Dental Exam: due  OB/GYN: Dr. aBy   Pap smear: 3/20/2023  HIV: 3/2022, neg  Hepatitis C  Ab: 3/2022, neg    Vaccinations:  Immunization History   Administered Date(s) Administered    COVID-19 MRNA, LN-S PF (MODERNA HALF 0.25 ML DOSE) 01/16/2022    COVID-19, MRNA, LN-S, PF (MODERNA FULL 0.5 ML DOSE) 06/17/2021, 07/20/2021    Influenza - Quadrivalent - PF *Preferred* (6 months and older) 10/06/2022, 11/17/2023    Td - PF (ADULT) 03/18/2022     Influenza: done  Tetanus: 2022  Covid vaccine: boosted x1    Body mass index is 41.81 kg/m².  Wt Readings from Last 3 Encounters:   04/18/24 103.7 kg (228 lb 9.9 oz)   03/07/24 103.7 kg (228 lb 9.9 oz)   02/06/24 100.7 kg (222 lb)       Review of Systems   Constitutional:  Negative for chills, diaphoresis, fatigue and fever.   HENT:  Negative for congestion, dental problem, ear discharge, ear pain, postnasal drip, rhinorrhea, sinus pressure, sore throat and trouble swallowing.    Eyes:  Negative for redness and visual disturbance.   Respiratory:  Negative for cough, chest tightness and shortness of breath.    Cardiovascular:  Negative for chest pain, palpitations and leg swelling.   Gastrointestinal:  Negative for abdominal pain, blood in stool, constipation, diarrhea, nausea and vomiting.   Endocrine: Negative for polydipsia and polyuria.   Genitourinary:  Negative for decreased urine volume, dysuria, frequency and hematuria.   Musculoskeletal:  Positive for arthralgias. Negative for back pain and myalgias.        Random joint pain, sometimes ankle, elbow or knee, present for a week and then goes away. Reports tick bite in past and was treated but she is unsure if she completed the full course of treatment   Skin:  Positive for rash (recent tick bite when she was camping in [place] recently). Negative for wound.   Neurological:  Negative for dizziness, weakness, numbness and  headaches.   Hematological:  Negative for adenopathy.   Psychiatric/Behavioral:  Negative for dysphoric mood and sleep disturbance. The patient is nervous/anxious.           Objective:     Physical Exam  Vitals reviewed.   Constitutional:       General: She is awake. She is not in acute distress.     Appearance: Normal appearance. She is well-developed and well-groomed. She is not diaphoretic.   HENT:      Head: Normocephalic and atraumatic.      Right Ear: Hearing, tympanic membrane, ear canal and external ear normal. Tympanic membrane is not erythematous or bulging.      Left Ear: Hearing, tympanic membrane, ear canal and external ear normal. Tympanic membrane is not erythematous or bulging.      Nose: Nose normal. No congestion.      Mouth/Throat:      Mouth: Mucous membranes are moist.      Tongue: No lesions.      Pharynx: Oropharynx is clear. Uvula midline. No oropharyngeal exudate or posterior oropharyngeal erythema.   Eyes:      General: Lids are normal. Vision grossly intact. Gaze aligned appropriately. No scleral icterus.     Conjunctiva/sclera:      Right eye: Right conjunctiva is not injected.      Left eye: Left conjunctiva is not injected.      Pupils: Pupils are equal, round, and reactive to light.   Neck:      Thyroid: No thyroid mass or thyromegaly.   Cardiovascular:      Rate and Rhythm: Normal rate and regular rhythm.      Pulses: Normal pulses.      Heart sounds: Normal heart sounds. No murmur heard.  Pulmonary:      Effort: Pulmonary effort is normal. No respiratory distress.      Breath sounds: Normal breath sounds. No decreased breath sounds or wheezing.   Abdominal:      General: Bowel sounds are normal. There is no distension.      Palpations: Abdomen is soft. Abdomen is not rigid.      Tenderness: There is no abdominal tenderness. There is no guarding or rebound.   Musculoskeletal:         General: Normal range of motion.      Cervical back: Normal range of motion and neck supple.       Right lower leg: No edema.      Left lower leg: No edema.   Lymphadenopathy:      Cervical: No cervical adenopathy.      Upper Body:      Right upper body: No supraclavicular adenopathy.      Left upper body: No supraclavicular adenopathy.   Skin:     General: Skin is warm and dry.      Coloration: Skin is not cyanotic.      Findings: No lesion or rash.      Nails: There is no clubbing.   Neurological:      General: No focal deficit present.      Mental Status: She is alert and oriented to person, place, and time.      Sensory: Sensation is intact.      Coordination: Coordination is intact.      Gait: Gait is intact.      Deep Tendon Reflexes: Reflexes are normal and symmetric.   Psychiatric:         Attention and Perception: Attention normal.         Mood and Affect: Mood normal.         Behavior: Behavior is cooperative.              Assessment:        1. Annual physical exam    2. Essential hypertension    3. Generalized anxiety disorder    4. Prediabetes    5. Arthralgia, unspecified joint    6. Vitamin D insufficiency    7. Morbid obesity with BMI of 40.0-44.9, adult           Plan:     1. Annual physical exam  - reviewed recent labs with patient    2. Essential hypertension  - increase labetalol to 200mg bid, monitor  - labetaloL (NORMODYNE) 200 MG tablet; Take 1 tablet (200 mg total) by mouth 2 (two) times daily.  Dispense: 60 tablet; Refill: 2    3. Generalized anxiety disorder  - not controlled, continue Effexor XR for now and she will consider increasing the dose vs changing to another agent since she is planning for pregnancy in 1 year     4. Prediabetes  - reduce sugar intake, increase physical activity as much as possible    5. Arthralgia, unspecified joint  - LYME DISEASE ANTIBODY BY EIA; Future    6. Vitamin D insufficiency  - continue Vitamin D 50,000 IU weekly    7. Morbid obesity with BMI of 40.0-44.9, adult  - discussed options for treatment, will determine if GLP-1 Ag can be covered by  insurance    RTC in 6 months for follow-up or sooner if needed    __________________________    Diamond Vasquez MD, PharmD  [facility]  American Board of Obesity Medicine diplomate  Office [phone]

## 2024-04-19 ENCOUNTER — PATIENT OUTREACH (OUTPATIENT)
Dept: ADMINISTRATIVE | Facility: HOSPITAL | Age: 39
End: 2024-04-19
Payer: COMMERCIAL

## 2024-04-19 ENCOUNTER — PATIENT MESSAGE (OUTPATIENT)
Dept: ADMINISTRATIVE | Facility: HOSPITAL | Age: 39
End: 2024-04-19
Payer: COMMERCIAL

## 2024-04-23 LAB — B BURGDOR AB SER IA-ACNC: 0.05 INDEX VALUE

## 2024-05-28 VITALS
WEIGHT: 228.63 LBS | OXYGEN SATURATION: 98 % | SYSTOLIC BLOOD PRESSURE: 120 MMHG | RESPIRATION RATE: 18 BRPM | HEIGHT: 62 IN | BODY MASS INDEX: 42.07 KG/M2 | HEART RATE: 81 BPM | DIASTOLIC BLOOD PRESSURE: 88 MMHG | TEMPERATURE: 97 F

## 2024-06-01 ENCOUNTER — PATIENT MESSAGE (OUTPATIENT)
Dept: INTERNAL MEDICINE | Facility: CLINIC | Age: 39
End: 2024-06-01
Payer: COMMERCIAL

## 2024-07-18 ENCOUNTER — OFFICE VISIT (OUTPATIENT)
Dept: INTERNAL MEDICINE | Facility: CLINIC | Age: 39
End: 2024-07-18
Payer: COMMERCIAL

## 2024-07-18 ENCOUNTER — LAB VISIT (OUTPATIENT)
Dept: LAB | Facility: HOSPITAL | Age: 39
End: 2024-07-18
Attending: INTERNAL MEDICINE
Payer: COMMERCIAL

## 2024-07-18 ENCOUNTER — PATIENT MESSAGE (OUTPATIENT)
Dept: INTERNAL MEDICINE | Facility: CLINIC | Age: 39
End: 2024-07-18

## 2024-07-18 VITALS
HEART RATE: 86 BPM | WEIGHT: 228.19 LBS | HEIGHT: 62 IN | DIASTOLIC BLOOD PRESSURE: 82 MMHG | BODY MASS INDEX: 41.99 KG/M2 | OXYGEN SATURATION: 98 % | TEMPERATURE: 97 F | SYSTOLIC BLOOD PRESSURE: 130 MMHG | RESPIRATION RATE: 18 BRPM

## 2024-07-18 DIAGNOSIS — I10 ESSENTIAL HYPERTENSION: Primary | ICD-10-CM

## 2024-07-18 DIAGNOSIS — E55.9 VITAMIN D INSUFFICIENCY: ICD-10-CM

## 2024-07-18 DIAGNOSIS — I10 ESSENTIAL HYPERTENSION: ICD-10-CM

## 2024-07-18 DIAGNOSIS — R07.9 CHEST PAIN ON EXERTION: ICD-10-CM

## 2024-07-18 DIAGNOSIS — Z82.49 FAMILY HISTORY OF HEART DISEASE: ICD-10-CM

## 2024-07-18 DIAGNOSIS — F41.1 GENERALIZED ANXIETY DISORDER: ICD-10-CM

## 2024-07-18 LAB
25(OH)D3+25(OH)D2 SERPL-MCNC: 23 NG/ML (ref 30–96)
ALBUMIN SERPL BCP-MCNC: 3.6 G/DL (ref 3.5–5.2)
ALP SERPL-CCNC: 67 U/L (ref 55–135)
ALT SERPL W/O P-5'-P-CCNC: 14 U/L (ref 10–44)
ANION GAP SERPL CALC-SCNC: 6 MMOL/L (ref 8–16)
AST SERPL-CCNC: 16 U/L (ref 10–40)
BASOPHILS # BLD AUTO: 0.07 K/UL (ref 0–0.2)
BASOPHILS NFR BLD: 1.2 % (ref 0–1.9)
BILIRUB SERPL-MCNC: 0.2 MG/DL (ref 0.1–1)
BUN SERPL-MCNC: 12 MG/DL (ref 6–20)
CALCIUM SERPL-MCNC: 9.3 MG/DL (ref 8.7–10.5)
CHLORIDE SERPL-SCNC: 104 MMOL/L (ref 95–110)
CO2 SERPL-SCNC: 29 MMOL/L (ref 23–29)
CREAT SERPL-MCNC: 0.9 MG/DL (ref 0.5–1.4)
DIFFERENTIAL METHOD BLD: ABNORMAL
EOSINOPHIL # BLD AUTO: 0.2 K/UL (ref 0–0.5)
EOSINOPHIL NFR BLD: 2.9 % (ref 0–8)
ERYTHROCYTE [DISTWIDTH] IN BLOOD BY AUTOMATED COUNT: 14.9 % (ref 11.5–14.5)
EST. GFR  (NO RACE VARIABLE): >60 ML/MIN/1.73 M^2
GLUCOSE SERPL-MCNC: 110 MG/DL (ref 70–110)
HCT VFR BLD AUTO: 35.9 % (ref 37–48.5)
HGB BLD-MCNC: 11.2 G/DL (ref 12–16)
IMM GRANULOCYTES # BLD AUTO: 0.01 K/UL (ref 0–0.04)
IMM GRANULOCYTES NFR BLD AUTO: 0.2 % (ref 0–0.5)
LYMPHOCYTES # BLD AUTO: 2.1 K/UL (ref 1–4.8)
LYMPHOCYTES NFR BLD: 36.2 % (ref 18–48)
MCH RBC QN AUTO: 28.2 PG (ref 27–31)
MCHC RBC AUTO-ENTMCNC: 31.2 G/DL (ref 32–36)
MCV RBC AUTO: 90 FL (ref 82–98)
MONOCYTES # BLD AUTO: 0.4 K/UL (ref 0.3–1)
MONOCYTES NFR BLD: 7.5 % (ref 4–15)
NEUTROPHILS # BLD AUTO: 3.1 K/UL (ref 1.8–7.7)
NEUTROPHILS NFR BLD: 52 % (ref 38–73)
NRBC BLD-RTO: 0 /100 WBC
OHS QRS DURATION: 86 MS
OHS QTC CALCULATION: 417 MS
PLATELET # BLD AUTO: 275 K/UL (ref 150–450)
PMV BLD AUTO: 11.6 FL (ref 9.2–12.9)
POTASSIUM SERPL-SCNC: 4.4 MMOL/L (ref 3.5–5.1)
PROT SERPL-MCNC: 6.9 G/DL (ref 6–8.4)
RBC # BLD AUTO: 3.97 M/UL (ref 4–5.4)
SODIUM SERPL-SCNC: 139 MMOL/L (ref 136–145)
WBC # BLD AUTO: 5.88 K/UL (ref 3.9–12.7)

## 2024-07-18 PROCEDURE — 1159F MED LIST DOCD IN RCRD: CPT | Mod: CPTII,S$GLB,, | Performed by: INTERNAL MEDICINE

## 2024-07-18 PROCEDURE — 3044F HG A1C LEVEL LT 7.0%: CPT | Mod: CPTII,S$GLB,, | Performed by: INTERNAL MEDICINE

## 2024-07-18 PROCEDURE — 3008F BODY MASS INDEX DOCD: CPT | Mod: CPTII,S$GLB,, | Performed by: INTERNAL MEDICINE

## 2024-07-18 PROCEDURE — 80053 COMPREHEN METABOLIC PANEL: CPT | Performed by: INTERNAL MEDICINE

## 2024-07-18 PROCEDURE — 1160F RVW MEDS BY RX/DR IN RCRD: CPT | Mod: CPTII,S$GLB,, | Performed by: INTERNAL MEDICINE

## 2024-07-18 PROCEDURE — 36415 COLL VENOUS BLD VENIPUNCTURE: CPT | Mod: PO | Performed by: INTERNAL MEDICINE

## 2024-07-18 PROCEDURE — 93005 ELECTROCARDIOGRAM TRACING: CPT | Mod: S$GLB,,, | Performed by: INTERNAL MEDICINE

## 2024-07-18 PROCEDURE — 3079F DIAST BP 80-89 MM HG: CPT | Mod: CPTII,S$GLB,, | Performed by: INTERNAL MEDICINE

## 2024-07-18 PROCEDURE — 93010 ELECTROCARDIOGRAM REPORT: CPT | Mod: S$GLB,,, | Performed by: INTERNAL MEDICINE

## 2024-07-18 PROCEDURE — 82306 VITAMIN D 25 HYDROXY: CPT | Performed by: INTERNAL MEDICINE

## 2024-07-18 PROCEDURE — 99214 OFFICE O/P EST MOD 30 MIN: CPT | Mod: S$GLB,,, | Performed by: INTERNAL MEDICINE

## 2024-07-18 PROCEDURE — 99999 PR PBB SHADOW E&M-EST. PATIENT-LVL IV: CPT | Mod: PBBFAC,,, | Performed by: INTERNAL MEDICINE

## 2024-07-18 PROCEDURE — 3075F SYST BP GE 130 - 139MM HG: CPT | Mod: CPTII,S$GLB,, | Performed by: INTERNAL MEDICINE

## 2024-07-18 PROCEDURE — 85025 COMPLETE CBC W/AUTO DIFF WBC: CPT | Performed by: INTERNAL MEDICINE

## 2024-07-18 NOTE — PROGRESS NOTES
Subjective:     Kady Mares is a 39 y.o. female who presents for   Chief Complaint   Patient presents with    Follow-up    Chest Pain    Anxiety    Hypertension       HPI    Chest Pain: Onset was  intermittent over the last few months. But the recent episode bothered patient.    Associated symptoms are: chest pressure/discomfort and irregular heart beat. Aggravating factors are: none. Alleviating factors are: rest. Patient's cardiac risk factors are: family history of premature cardiovascular disease, hypertension, and obesity (BMI >= 30 kg/m2). Patient's risk factors for DVT/PE: none.     Hypertension: The patient has been taking medications as instructed, no medication side effects noted, no chest pain on exertion, no dyspnea on exertion, and no swelling of ankles    Anxiety: She presents for follow up of anxiety disorder. Current symptoms: none. She denies current suicidal and homicidal ideation. She complains of the following side effects from the treatment: none.        Review of Systems   Constitutional:  Positive for fatigue (related to lack of sleep with busy work schedule). Negative for chills, diaphoresis and fever.   HENT:  Negative for congestion, ear pain, postnasal drip, sinus pressure and sore throat.         She reports intermittent eye twitching for the last few months   Eyes:  Negative for discharge, redness and visual disturbance.   Respiratory:  Negative for cough and shortness of breath.    Cardiovascular:  Positive for chest pain (occurs with activity, last episode was at a comedy show and she was walking a lot when sthe pain started). Negative for palpitations and leg swelling.   Gastrointestinal:  Negative for abdominal pain, constipation, diarrhea, nausea and vomiting.   Musculoskeletal:  Negative for arthralgias and myalgias.   Skin:  Negative for rash and wound.   Neurological:  Negative for dizziness, tremors and headaches.   Psychiatric/Behavioral:  Negative for  dysphoric mood. The patient is nervous/anxious.         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 She has completely pulled out her eyelashes in the past. She has never seen anyone to evaluate this.          Objective:     Physical Exam  Vitals reviewed.   Constitutional:       General: She is awake. She is not in acute distress.     Appearance: Normal appearance. She is well-developed and well-groomed.   HENT:      Head: Normocephalic and atraumatic.      Right Ear: Hearing and external ear normal.      Left Ear: Hearing and external ear normal.      Nose: Nose normal. No congestion.      Mouth/Throat:      Mouth: Mucous membranes are moist.   Eyes:      General: Lids are normal. Vision grossly intact.   Cardiovascular:      Rate and Rhythm: Normal rate and regular rhythm.      Heart sounds: Normal heart sounds. No murmur heard.  Pulmonary:      Effort: Pulmonary effort is normal.      Breath sounds: Normal breath sounds. No decreased breath sounds or wheezing.   Abdominal:      General: Bowel sounds are normal. There is no distension.   Musculoskeletal:         General: Normal range of motion.      Cervical back: Normal range of motion.      Right lower leg: No edema.      Left lower leg: No edema.   Skin:     General: Skin is warm and dry.      Findings: No lesion or rash.   Neurological:      Mental Status: She is alert and oriented to person, place, and time.   Psychiatric:         Attention and Perception: Attention normal.         Mood and Affect: Mood normal.         Behavior: Behavior is cooperative.            Assessment:      1. Essential hypertension    2. Chest pain on exertion    3. Generalized anxiety disorder    4. Vitamin D insufficiency    5. Family history of heart disease           Plan:     1. Essential hypertension  - controlled, continue labetalol  - Comprehensive Metabolic Panel; Future    2. Chest pain on exertion  - IN OFFICE EKG 12-LEAD (to Muse)  - CBC Auto Differential; Future  - Ambulatory  referral/consult to Cardiology; Future  - Exercise Stress - EKG; Future    3. Generalized anxiety disorder  - some of symptoms appear to be related to anxiety, discussed changing medication but she would like to wait, continue effexor for now    4. Vitamin D insufficiency  - Vitamin D; Future    5. Family history of heart disease  - Ambulatory referral/consult to Cardiology; Future    ---> patient was given information about the new [facility]    RTC in 6 months for follow-up or sooner if needed (virtual ok)    __________________________    Diamond Vasquez MD, PharmD  [facility]  American Board of Obesity Medicine diplomate  Office [phone]

## 2024-08-12 ENCOUNTER — PATIENT MESSAGE (OUTPATIENT)
Dept: CARDIOLOGY | Facility: HOSPITAL | Age: 39
End: 2024-08-12
Payer: COMMERCIAL

## 2024-08-19 DIAGNOSIS — F41.1 GENERALIZED ANXIETY DISORDER: ICD-10-CM

## 2024-08-19 RX ORDER — PROPRANOLOL HYDROCHLORIDE 10 MG/1
TABLET ORAL
Qty: 30 TABLET | Refills: 2 | Status: SHIPPED | OUTPATIENT
Start: 2024-08-19

## 2024-08-19 NOTE — TELEPHONE ENCOUNTER
No care due was identified.  Health [facility] Embedded Care Due Messages. Reference number: [id number].   8/19/2024 8:38:26 AM CDT

## 2024-08-19 NOTE — TELEPHONE ENCOUNTER
Refill Routing Note   Medication(s) are not appropriate for processing by [facility] for the following reason(s):      Medication outside of protocol    [facility] action(s):  Route Care Due:  None identified     Medication Therapy Plan: PRN is outside of protocol for [facility]      Appointments  past 12m or future 3m with PCP    Date Provider   Last Visit   7/18/2024 Diamond Vasquez MD   Next Visit   Visit date not found Diamond Vasquez MD   ED visits in past 90 days: 0        Note composed:4:18 PM 08/19/2024

## 2024-08-29 ENCOUNTER — HOSPITAL ENCOUNTER (OUTPATIENT)
Dept: CARDIOLOGY | Facility: HOSPITAL | Age: 39
Discharge: HOME | End: 2024-08-29
Attending: INTERNAL MEDICINE
Payer: COMMERCIAL

## 2024-08-29 DIAGNOSIS — R07.9 CHEST PAIN ON EXERTION: ICD-10-CM

## 2024-08-29 LAB
CV STRESS BASE HR: 85 BPM
DIASTOLIC BLOOD PRESSURE: 78 MMHG
OHS CV CPX 1 MINUTE RECOVERY HEART RATE: 150 BPM
OHS CV CPX 85 PERCENT MAX PREDICTED HEART RATE MALE: 154
OHS CV CPX ESTIMATED METS: 10
OHS CV CPX MAX PREDICTED HEART RATE: 181
OHS CV CPX PATIENT IS FEMALE: 1
OHS CV CPX PATIENT IS MALE: 0
OHS CV CPX PEAK DIASTOLIC BLOOD PRESSURE: 87 MMHG
OHS CV CPX PEAK HEAR RATE: 157 BPM
OHS CV CPX PEAK RATE PRESSURE PRODUCT: NORMAL
OHS CV CPX PEAK SYSTOLIC BLOOD PRESSURE: 144 MMHG
OHS CV CPX PERCENT MAX PREDICTED HEART RATE ACHIEVED: 91
OHS CV CPX RATE PRESSURE PRODUCT PRESENTING: NORMAL
STRESS ECHO POST EXERCISE DUR MIN: 6 MINUTES
STRESS ECHO POST EXERCISE DUR SEC: 13 SECONDS
SYSTOLIC BLOOD PRESSURE: 136 MMHG

## 2024-08-29 PROCEDURE — 93016 CV STRESS TEST SUPVJ ONLY: CPT | Mod: ,,, | Performed by: INTERNAL MEDICINE

## 2024-08-29 PROCEDURE — 93017 CV STRESS TEST TRACING ONLY: CPT | Mod: PO

## 2024-08-29 PROCEDURE — 93018 CV STRESS TEST I&R ONLY: CPT | Mod: ,,, | Performed by: INTERNAL MEDICINE

## 2024-09-11 ENCOUNTER — PATIENT MESSAGE (OUTPATIENT)
Dept: CARDIOLOGY | Facility: CLINIC | Age: 39
End: 2024-09-11
Payer: COMMERCIAL

## 2024-09-12 ENCOUNTER — OFFICE VISIT (OUTPATIENT)
Dept: CARDIOLOGY | Facility: CLINIC | Age: 39
End: 2024-09-12
Payer: COMMERCIAL

## 2024-09-12 VITALS
WEIGHT: 227.75 LBS | SYSTOLIC BLOOD PRESSURE: 118 MMHG | BODY MASS INDEX: 41.91 KG/M2 | HEART RATE: 76 BPM | HEIGHT: 62 IN | DIASTOLIC BLOOD PRESSURE: 76 MMHG

## 2024-09-12 DIAGNOSIS — E66.01 OBESITY, CLASS III, BMI 40-49.9 (MORBID OBESITY): ICD-10-CM

## 2024-09-12 DIAGNOSIS — I10 PRIMARY HYPERTENSION: ICD-10-CM

## 2024-09-12 DIAGNOSIS — R07.9 CHEST PAIN ON EXERTION: ICD-10-CM

## 2024-09-12 DIAGNOSIS — R07.2 PRECORDIAL PAIN: Primary | ICD-10-CM

## 2024-09-12 PROCEDURE — 3074F SYST BP LT 130 MM HG: CPT | Mod: CPTII,S$GLB,, | Performed by: INTERNAL MEDICINE

## 2024-09-12 PROCEDURE — 1159F MED LIST DOCD IN RCRD: CPT | Mod: CPTII,S$GLB,, | Performed by: INTERNAL MEDICINE

## 2024-09-12 PROCEDURE — 99204 OFFICE O/P NEW MOD 45 MIN: CPT | Mod: S$GLB,,, | Performed by: INTERNAL MEDICINE

## 2024-09-12 PROCEDURE — 3008F BODY MASS INDEX DOCD: CPT | Mod: CPTII,S$GLB,, | Performed by: INTERNAL MEDICINE

## 2024-09-12 PROCEDURE — 1160F RVW MEDS BY RX/DR IN RCRD: CPT | Mod: CPTII,S$GLB,, | Performed by: INTERNAL MEDICINE

## 2024-09-12 PROCEDURE — 3078F DIAST BP <80 MM HG: CPT | Mod: CPTII,S$GLB,, | Performed by: INTERNAL MEDICINE

## 2024-09-12 PROCEDURE — 3044F HG A1C LEVEL LT 7.0%: CPT | Mod: CPTII,S$GLB,, | Performed by: INTERNAL MEDICINE

## 2024-09-12 PROCEDURE — 99999 PR PBB SHADOW E&M-EST. PATIENT-LVL IV: CPT | Mod: PBBFAC,,, | Performed by: INTERNAL MEDICINE

## 2024-09-12 NOTE — PROGRESS NOTES
Subjective:     Chief Complaint:  Kady Mares is a 39 y.o. female referred by Diamond Vasquez MD for evaluation of chest pains.    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 The discomfort was described as a tightness localized to mid sternal region.  One episode occurred after she ate a heavier than usual meal and was walking to her car.  She rated it at a 6/10 in intensity.  There was associated shortness breath.  She also has chest discomfort attributed to anxiety that is treated w breathing and propranolol for 5 minutes.  She feels this is different from the chest discomfort associated with anxiety.  An exercise treadmill test was negative for ischemia.  She exercised for 6 minutes and 13 seconds on a high ramp protocol corresponding to an estimated 10 METs.  Peak heart rate was 157 bpm.  She does not have a family history of premature CAD.      Review of patient's allergies indicates:   Allergen Reactions    Wellbutrin [bupropion hcl] Itching        Current Outpatient Medications   Medication Sig    b complex vitamins tablet Take 1 tablet by mouth once daily.    ergocalciferol (ERGOCALCIFEROL) 50,000 unit Cap Take 1 capsule (50,000 Units total) by mouth every 7 days.    labetaloL (NORMODYNE) 200 MG tablet Take 1 tablet (200 mg total) by mouth 2 (two) times daily.    multivitamin (THERAGRAN) per tablet Take 1 tablet by mouth once daily.    propranoloL (INDERAL) 10 MG tablet Take 1 tablet by mouth daily as needed for anxiety.    venlafaxine (EFFEXOR-XR) 150 MG Cp24 Take 1 capsule by mouth every morning     No current facility-administered medications for this visit.         Past Medical and Surgical history:  Kady Mares  has a past medical history of Mild depression (11/02/2018), Multiple thyroid nodules, Nephrolithiasis, and Vertigo.   Past Surgical History:   Procedure Laterality Date    MYOMECTOMY   "09/22/2015       Social history:  Kady Mares  reports that she has never smoked. She has never used smokeless tobacco. She reports that she does not drink alcohol and does not use drugs.    Family history:  Kady's family history includes Diabetes in her father and mother; Heart disease in her mother; Hypertension in her father and mother; Kidney cancer in her father; Pacemaker/defibrilator in her mother; Prostate cancer in her father and paternal uncle.      Objective:   /76   Pulse 76   Ht 5' 2" (1.575 m)   Wt 103.3 kg (227 lb 11.8 oz)   BMI 41.65 kg/m²    Physical Exam  Constitutional:       Appearance: She is well-developed.      Comments:      HENT:      Head: Normocephalic.   Neck:      Vascular: No carotid bruit or JVD.   Cardiovascular:      Rate and Rhythm: Normal rate and regular rhythm.      Pulses:           Radial pulses are 2+ on the right side and 2+ on the left side.        Posterior tibial pulses are 2+ on the right side and 2+ on the left side.      Heart sounds: S1 normal and S2 normal. No murmur heard.     No gallop.   Pulmonary:      Effort: Pulmonary effort is normal.      Breath sounds: No wheezing or rales.   Chest:      Chest wall: There is no dullness to percussion.   Abdominal:      General: There is no abdominal bruit.      Palpations: Abdomen is soft. There is no hepatomegaly or splenomegaly.      Tenderness: There is no abdominal tenderness.   Musculoskeletal:      Right lower leg: No edema.      Left lower leg: No edema.   Skin:     General: Skin is warm and dry.      Findings: No bruising or rash.      Nails: There is no clubbing.   Neurological:      Mental Status: She is alert and oriented to person, place, and time.      Gait: Gait normal.   Psychiatric:         Speech: Speech normal.         Behavior: Behavior normal.         Judgment: Judgment normal.           Labs  Lipids:  Recent Labs   Lab 03/21/23  0916   Cholesterol 194   LDL Cholesterol 128.4 "   HDL 52      Renal:  Recent Labs   Lab 07/18/24  0928   Creatinine 0.9   Potassium 4.4   CO2 29   BUN 12     Liver:  Recent Labs   Lab 07/18/24  0928   AST 16   ALT 14     Cbc:    Lab Results   Component Value Date    WBC 5.88 07/18/2024    HGB 11.2 (L) 07/18/2024    HCT 35.9 (L) 07/18/2024    MCV 90 07/18/2024     07/18/2024           No results found for this or any previous visit.       Results for orders placed during the hospital encounter of 08/29/24    Exercise Stress - EKG    Interpretation Summary    The ECG portion of the study is negative for ischemia.    The patient reported no chest pain during the stress test.    The blood pressure response to stress was normal.    There were no arrhythmias during stress.    The exercise capacity was normal.      No results found for this or any previous visit (from the past 2160 hour(s)).        Assessment and Plan:       ICD-10-CM ICD-9-CM   1. Precordial pain  R07.2 786.51   2. Chest pain on exertion  R07.9 786.50   3. Primary hypertension  I10 401.9   4. BMI 40  E66.01 278.01        She has precordial chest discomfort with exertion.  This is concerning for angina and requires further testing even though at her age I would have felt that her chest discomfort was not likely to be angina.  A plain treadmill may be a false negative.  We discussed stress testing with imaging or a cardiac CTA.  I recommended a cardiac CTA.    Orders entered during this encounter:    Precordial pain  -     CTA Cardiac; Future; Expected date: 09/12/2024    Chest pain on exertion  -     Ambulatory referral/consult to Cardiology    Primary hypertension    BMI 40         No follow-ups on file.

## 2024-09-12 NOTE — PATIENT INSTRUCTIONS
Skip LABETALOL the night before and the morning of the CT scan. Bring one dose with you.  Take 2 tab of PROPRANOLOL at bed time and 2 more tab 1-2 hours prior to the scan.   We are aiming for a resting heart rate of 55-60 pm.   Drink plenty of fluids night before and the morning of but nothing solids 8 for at least 4 hours.

## 2024-09-20 ENCOUNTER — PATIENT MESSAGE (OUTPATIENT)
Dept: CARDIOLOGY | Facility: HOSPITAL | Age: 39
End: 2024-09-20
Payer: COMMERCIAL

## 2024-09-20 ENCOUNTER — TELEPHONE (OUTPATIENT)
Dept: CARDIOLOGY | Facility: HOSPITAL | Age: 39
End: 2024-09-20
Payer: COMMERCIAL

## 2024-09-20 NOTE — TELEPHONE ENCOUNTER
Attempted to call patient in regards to their upcoming Cardiac CTA scheduled for 09/25/2024 at 8:00.     Reminder for the patient to fast for 4-hours prior to the test, no caffeine the AM of, and can have water.     For questions or concerns: I am available M-F 7:30-4, please call [phone]. Thank you!

## 2024-09-23 DIAGNOSIS — I10 ESSENTIAL HYPERTENSION: ICD-10-CM

## 2024-09-23 RX ORDER — LABETALOL 200 MG/1
200 TABLET, FILM COATED ORAL 2 TIMES DAILY
Qty: 180 TABLET | Refills: 3 | Status: SHIPPED | OUTPATIENT
Start: 2024-09-23

## 2024-09-23 NOTE — TELEPHONE ENCOUNTER
No care due was identified.  Health [facility] Embedded Care Due Messages. Reference number: [id number].   9/23/2024 2:02:21 AM CDT

## 2024-09-23 NOTE — TELEPHONE ENCOUNTER
Refill Decision Note   Kady Mares  is requesting a refill authorization.  Brief Assessment and Rationale for Refill:  Approve     Medication Therapy Plan:         Alert overridden per protocol: Yes   Comments:     Note composed:2:08 PM 09/23/2024

## 2024-09-25 ENCOUNTER — HOSPITAL ENCOUNTER (OUTPATIENT)
Dept: RADIOLOGY | Facility: HOSPITAL | Age: 39
Discharge: HOME | End: 2024-09-25
Attending: INTERNAL MEDICINE
Payer: COMMERCIAL

## 2024-09-25 VITALS
HEART RATE: 72 BPM | OXYGEN SATURATION: 100 % | DIASTOLIC BLOOD PRESSURE: 99 MMHG | RESPIRATION RATE: 18 BRPM | SYSTOLIC BLOOD PRESSURE: 178 MMHG

## 2024-09-25 DIAGNOSIS — R07.2 PRECORDIAL PAIN: ICD-10-CM

## 2024-09-25 PROCEDURE — 25500020 PHARM REV CODE 255: Performed by: INTERNAL MEDICINE

## 2024-09-25 PROCEDURE — 75574 CT ANGIO HRT W/3D IMAGE: CPT | Mod: TC

## 2024-09-25 PROCEDURE — 75574 CT ANGIO HRT W/3D IMAGE: CPT | Mod: 26,,, | Performed by: STUDENT IN AN ORGANIZED HEALTH CARE EDUCATION/TRAINING PROGRAM

## 2024-09-25 RX ADMIN — IOHEXOL 100 ML: 350 INJECTION, SOLUTION INTRAVENOUS at 08:09

## 2024-09-25 NOTE — NURSING
Pt arrived to [facility] for cardiac CTA.  Pt AAOx4, ambulatory, no distress noted. Pt informed of procedure, need for PIV and side effects of contrast and nitroglycerin.  Pt connected for continuous vital sign monitoring. VS assessed and put in flowsheets.  IV in place.  Pt verbalizes understanding.             Pt arrived to [facility] for cardiac CTA.  Pt AAOx4, ambulatory, no distress noted. Pt informed of procedure, need for PIV and side effects of contrast and nitroglycerin.  Pt connected for continuous vital sign monitoring. VS assessed and put in flowsheets.  IV in place.  Pt verbalizes understanding.

## 2024-09-25 NOTE — NURSING
Pt VSS post CTA study. Pt exhibiting no adverse effects from medication. Pt able to adequately ambulate with no dizziness or SOB. IV removed, site dressed with guaze and coban. [facility]. Pt educated on post study instructions. Verbalized understanding.

## 2024-09-25 NOTE — LETTER
September 25, 2024      [facility]  [address] [address]       Patient: Kady Mares   YOB: 1985  Date of Visit: 09/25/2024    To Whom It May Concern:    Lyssa Mares  was at [facility] on 09/25/2024. The patient may return to work/school on 9/27/24 with no restrictions. If you have any questions or concerns, or if I can be of further assistance, please do not hesitate to contact me.    Sincerely,    Crystal Wei, RT

## 2024-09-26 DIAGNOSIS — I10 ESSENTIAL HYPERTENSION: Primary | ICD-10-CM

## 2024-09-26 RX ORDER — ATORVASTATIN CALCIUM 20 MG/1
20 TABLET, FILM COATED ORAL DAILY
Qty: 90 TABLET | Refills: 3 | Status: SHIPPED | OUTPATIENT
Start: 2024-09-26 | End: 2024-09-27 | Stop reason: SDUPTHER

## 2024-09-27 ENCOUNTER — TELEPHONE (OUTPATIENT)
Dept: CARDIOLOGY | Facility: CLINIC | Age: 39
End: 2024-09-27
Payer: COMMERCIAL

## 2024-09-27 DIAGNOSIS — I10 ESSENTIAL HYPERTENSION: ICD-10-CM

## 2024-09-27 RX ORDER — ATORVASTATIN CALCIUM 20 MG/1
20 TABLET, FILM COATED ORAL DAILY
Qty: 90 TABLET | Refills: 3 | Status: SHIPPED | OUTPATIENT
Start: 2024-09-27

## 2024-09-27 NOTE — TELEPHONE ENCOUNTER
----- Message from Tab Burnett MD sent at 9/26/2024  7:13 PM CDT -----  I discussed the results of the CT with the patient.  There was no anomalous coronary origin or obstructive CAD.  There was some calcified plaque in the proximal LAD.  Although the score was not significantly elevated it was at the ninety-ninth percentile for age and gender.  I recommended starting atorvastatin 20 mg.  I do not think she needs an aspirin at the present time. I'd like her to get lipids and a CMP in 3 mo and fup w me or ANTONIA in 12 mo w the same labs. We also discussed weight loss, exercise and a Mediterranean diet

## 2024-10-03 ENCOUNTER — OFFICE VISIT (OUTPATIENT)
Dept: OTOLARYNGOLOGY | Facility: CLINIC | Age: 39
End: 2024-10-03
Payer: COMMERCIAL

## 2024-10-03 ENCOUNTER — CLINICAL SUPPORT (OUTPATIENT)
Dept: OTOLARYNGOLOGY | Facility: CLINIC | Age: 39
End: 2024-10-03
Payer: COMMERCIAL

## 2024-10-03 VITALS
HEART RATE: 66 BPM | SYSTOLIC BLOOD PRESSURE: 142 MMHG | WEIGHT: 230.38 LBS | DIASTOLIC BLOOD PRESSURE: 91 MMHG | BODY MASS INDEX: 42.14 KG/M2

## 2024-10-03 DIAGNOSIS — H93.8X1 EAR FULLNESS, RIGHT: Primary | ICD-10-CM

## 2024-10-03 DIAGNOSIS — H92.01 OTALGIA OF RIGHT EAR: Primary | ICD-10-CM

## 2024-10-03 DIAGNOSIS — F41.1 GENERALIZED ANXIETY DISORDER: ICD-10-CM

## 2024-10-03 DIAGNOSIS — H69.93 DYSFUNCTION OF BOTH EUSTACHIAN TUBES: ICD-10-CM

## 2024-10-03 DIAGNOSIS — H61.23 BILATERAL IMPACTED CERUMEN: ICD-10-CM

## 2024-10-03 DIAGNOSIS — H93.299 ABNORMAL AUDITORY PERCEPTION, UNSPECIFIED LATERALITY: ICD-10-CM

## 2024-10-03 PROCEDURE — 99999 PR PBB SHADOW E&M-EST. PATIENT-LVL I: CPT | Mod: PBBFAC,,, | Performed by: PHYSICIAN ASSISTANT

## 2024-10-03 PROCEDURE — 99999 PR PBB SHADOW E&M-EST. PATIENT-LVL III: CPT | Mod: PBBFAC,,, | Performed by: NURSE PRACTITIONER

## 2024-10-03 RX ORDER — FLUTICASONE PROPIONATE 50 MCG
2 SPRAY, SUSPENSION (ML) NASAL DAILY
Qty: 16 G | Refills: 11 | Status: SHIPPED | OUTPATIENT
Start: 2024-10-03

## 2024-10-03 NOTE — PROGRESS NOTES
"  Chief Complaint   Patient presents with    Ear Fullness     Right ear       HPI: Kady Mares is a 39 y.o. female who is referred to me by Dr. Dimaond Vasquez in consultationfor right ear stuffiness",ear fullness and ear pain which has been present 3 months ago..  She reports the symptoms have been are resolved.  She has not been any experiencing nasal congestion, post nasal drip, rhinorrhea, and hearing loss. She reports ear fullness and pain started after she went swimming with ear and nose plugs. She denies hearing loss, tinnitus and drainage.   The patient denies symptoms of allergic rhinitis/chronic rhinitis including congestions, postnasal drip, rhinorrhea, sneezing, itching, and sinus pressure.      Past Medical History:   Diagnosis Date    Mild depression 11/02/2018    Multiple thyroid nodules     Nephrolithiasis     Vertigo      Social History     Socioeconomic History    Marital status:    Tobacco Use    Smoking status: Never    Smokeless tobacco: Never   Substance and Sexual Activity    Alcohol use: No     Alcohol/week: 0.0 standard drinks of alcohol    Drug use: No    Sexual activity: Not Currently     Social Drivers of Health     Financial Resource Strain: Low Risk  (3/1/2024)    Overall Financial Resource Strain (CARDIA)     Difficulty of Paying Living Expenses: Not hard at all   Food Insecurity: No Food Insecurity (3/1/2024)    Hunger Vital Sign     Worried About Running Out of Food in the Last Year: Never true     Ran Out of Food in the Last Year: Never true   Transportation Needs: No Transportation Needs (3/1/2024)    PRAPARE - Transportation     Lack of Transportation (Medical): No     Lack of Transportation (Non-Medical): No   Physical Activity: Insufficiently Active (3/1/2024)    Exercise Vital Sign     Days of Exercise per Week: 2 days     Minutes of Exercise per Session: 10 min   Stress: No Stress Concern Present (3/1/2024)    [place] [employer] " - Occupational Stress Questionnaire     Feeling of Stress : Not at all   Housing Stability: Low Risk  (3/1/2024)    Housing Stability Vital Sign     Unable to Pay for Housing in the Last Year: No     Number of Places Lived in the Last Year: 1     Unstable Housing in the Last Year: No     Past Surgical History:   Procedure Laterality Date    MYOMECTOMY  09/22/2015     Family History   Problem Relation Name Age of Onset    Diabetes Mother      Hypertension Mother      Heart disease Mother      Pacemaker/defibrilator Mother          at age 67    Diabetes Father      Hypertension Father      Prostate cancer Father      Kidney cancer Father      Prostate cancer Paternal Uncle      Breast cancer Neg Hx      Colon cancer Neg Hx      Ovarian cancer Neg Hx             Review of Systems  General: negative for chills, fever or weight loss  Psychological: negative for mood changes or depression  Ophthalmic: negative for blurry vision, photophobia or eye pain  ENT: see HPI  Respiratory: no cough, shortness of breath, or wheezing  Cardiovascular: no chest pain or dyspnea on exertion  Gastrointestinal: no abdominal pain, change in bowel habits, or black/ bloody stools  Musculoskeletal: negative for gait disturbance or muscular weakness  Neurological: no syncope or seizures; no ataxia  Dermatological: negative for pruritis,  rash and jaundice  Hematologic/lymphatic: no easy bruising, no new adenopathy      Physical Exam:    Vitals:    10/03/24 0824   BP: (!) 142/91   Pulse: 66     Constitutional: Well appearing / communicating without difficutly.  NAD.  Eyes: EOM I Bilaterally  Head/Face: Normocephalic. Negative paranasal sinus pressure/tenderness.  Salivary glands WNL.  House Brackmann I Bilaterally.    Right Ear: Auricle normal appearance. External Auditory Canal with cerumen debris close to the ear drum. EAC within normal limits no lesions or masses,TM w/o masses/lesions/perforations. TM mobility noted.   Left Ear: Auricle  normal appearance. External Auditory Canal with cerumen impaction. EAC within normal limits no lesions or masses,TM w/o masses/lesions/perforations. TM mobility noted.  Nose: No gross nasal septal deviation. Inferior Turbinates 3+ bilaterally. No septal perforation. No masses/lesions. External nasal skin appears normal without masses/lesions.   Oral Cavity: Gingiva/lips within normal limits.  Dentition/gingiva healthy appearing. Mucus membranes moist. Floor of mouth soft, no masses palpated. Oral Tongue mobile. Hard Palate appears normal.    Oropharynx: Base of tongue appears normal. No masses/lesions noted. Tonsillar fossa/pharyngeal wall without lesions. Posterior oropharynx WNL.  Soft palate without masses. Midline uvula.   Neck/Lymphatic: No LAD I-VI bilaterally.  No thyromegaly.  No masses noted on exam.    Mirror laryngoscopy/nasopharyngoscopy: Active gag reflex.  Unable to perform.    Neuro/Psychiatric: AOx3.  Normal mood and affect.   Cardiovascular: Normal carotid pulses bilaterally, no increasing jugular venous distention noted at cervical region bilaterally.    Respiratory: Normal respiratory effort, no stridor, no retractions noted.    Ear Cerumen Removal     Date/Time: 10/3/2024 8:40 AM     Performed by: Silvana Tang NP  Authorized by: Silvana Tang NP    Consent Done?:  Yes (Verbal)     Local anesthetic:  None  Location details:  Both ears  Procedure type comment:  Suction  Cerumen  Removal Results:  Cerumen completely removed  Patient tolerance:  Patient tolerated the procedure well with no immediate complications    Audiogram interpreted personally by me and discussed in detail with the patient today.   Audiogram results revealed normal hearing sensitivity bilaterally.  Speech reception thresholds were noted at 5 dB in the right ear and 5 dB in the left ear.  Speech discrimination scores were 100% in the right ear and 100% in the left ear.  Tympanometry revealed Type A in the right ear and  Type A in the left ear.       Assessment:    ICD-10-CM ICD-9-CM    1. Otalgia of right ear  H92.01 388.70       2. Dysfunction of both eustachian tubes  H69.93 381.81       3. Bilateral impacted cerumen  H61.23 380.4 Ear Cerumen Removal        The primary encounter diagnosis was Otalgia of right ear. Diagnoses of Dysfunction of both eustachian tubes and Bilateral impacted cerumen were also pertinent to this visit.      Plan:  Orders Placed This Encounter   Procedures    Ear Cerumen Removal     -otoscopic exam within normal limits after cerumen removal  -audiogram revealed normal hearing in bilateral ears  -ear pain has resolved.     -We had a long discussion regarding the anatomy and function of the eustachian tube.  We discussed that the eustachian tube acts as a pump to keep the appropriate amount of pressure behind the ear drum. I discussed auto-insufflation exercises.     I gave the patient a prescription for a nasal steroid spray to be used on a daily basis, and we discussed that it will take 2-3 weeks of daily use to achieve maximal effectiveness.  -start on Flonase 2 sprays in each nostril daily  -follow up PRN      Silvana Tang NP        Answers submitted by the patient for this visit:  Review of Symptoms Questionnaire  (Submitted on 10/3/2024)  None of these: Yes  None of these: Yes  None of these : Yes  Snoring?: Yes  shortness of breath: Yes  chest pain: Yes  None of these: Yes  None of these: Yes  None of these : Yes  None of these: Yes  None of these : Yes  None of these: Yes  None of these: Yes  nervous/ anxious: Yes

## 2024-10-03 NOTE — TELEPHONE ENCOUNTER
Refill Routing Note   Medication(s) are not appropriate for processing by [facility] for the following reason(s):        Required vitals abnormal    [facility] action(s):  Defer               Appointments  past 12m or future 3m with PCP    Date Provider   Last Visit   7/18/2024 Diamond Vasquez MD   Next Visit   Visit date not found Diamond Vasquez MD   ED visits in past 90 days: 0        Note composed:1:05 PM 10/03/2024

## 2024-10-03 NOTE — TELEPHONE ENCOUNTER
No care due was identified.  [facility] Embedded Care Due Messages. Reference number: [id number].   10/03/2024 12:21:30 AM CDT

## 2024-10-03 NOTE — TELEPHONE ENCOUNTER
----- Message from Diamond Vasquez MD sent at 9/28/2024  3:40 PM CDT -----  >>>>> enter 6 mo f/u (from last appt in July)       (VIRTUAL is OK)

## 2024-10-03 NOTE — PROGRESS NOTES
Kady Mares, a 39 y.o. female, was seen today in the clinic for an audiologic evaluation.  Patients main complaint was Right ear fullness along with decreased hearing at times.  She reports these symptoms started in March after she completed a swimming session.  Her symptoms are better today.  She denies ear pain, ear drainage or tinnitus.  She denies a family history of hearing loss or a history of noise exposure.    Audiogram results revealed normal hearing sensitivity bilaterally.  Speech reception thresholds were noted at 5 dB in the right ear and 5 dB in the left ear.  Speech discrimination scores were 100% in the right ear and 100% in the left ear.  Tympanometry revealed Type A in the right ear and Type A in the left ear. The results of the audiogram were reviewed with the patient.    Recommendations:  Otologic evaluation  Annual audiogram  Hearing protection when in noise

## 2024-10-03 NOTE — PROCEDURES
Ear Cerumen Removal    Date/Time: 10/3/2024 8:40 AM    Performed by: Silvana Tang NP  Authorized by: Silvana Tang NP    Consent Done?:  Yes (Verbal)    Local anesthetic:  None  Location details:  Both ears  Procedure type comment:  Suction  Cerumen  Removal Results:  Cerumen completely removed  Patient tolerance:  Patient tolerated the procedure well with no immediate complications

## 2024-10-07 RX ORDER — VENLAFAXINE HYDROCHLORIDE 150 MG/1
CAPSULE, EXTENDED RELEASE ORAL
Qty: 90 CAPSULE | Refills: 1 | Status: SHIPPED | OUTPATIENT
Start: 2024-10-07

## 2024-10-21 NOTE — PROGRESS NOTES
Patient ID: Kady Mares is a 39 y.o. Black or  female    Subjective  Chief Complaint: patient presents for medical weight loss management.    Contraindications to GLP-1 receptor agonist therapy:   Denies personal or family history of MTC and personal history of MEN2     Pregnancy Status:   - Pt denies current pregnancy, breastfeeding, or plans to become pregnant.  - Pt denies current use of oral hormonal contraception.   - Pt has been informed about hormonal changes and potential to affect fertility while taking this medication.     Co-morbidities: HTN, prediabetes    History of weight loss therapy:  Pt denies previous weight management medication use.     Weight loss history:  Starting weight:    9/27/2024   Recent Readings    Weight (lbs) 228 lb    BMI 41.7 BMI      Objective  Lab Results   Component Value Date     07/18/2024     03/07/2024     03/21/2023     Lab Results   Component Value Date    K 4.4 07/18/2024    K 4.0 03/07/2024    K 4.0 03/21/2023     Lab Results   Component Value Date     07/18/2024     03/07/2024     03/21/2023     Lab Results   Component Value Date    CO2 29 07/18/2024    CO2 26 03/07/2024    CO2 26 03/21/2023     Lab Results   Component Value Date    BUN 12 07/18/2024    BUN 16 03/07/2024    BUN 12 03/21/2023     Lab Results   Component Value Date     07/18/2024     03/07/2024     03/21/2023     Lab Results   Component Value Date    CALCIUM 9.3 07/18/2024    CALCIUM 10.1 03/07/2024    CALCIUM 9.6 03/21/2023     Lab Results   Component Value Date    PROT 6.9 07/18/2024    PROT 7.6 03/07/2024    PROT 7.0 03/21/2023     Lab Results   Component Value Date    ALBUMIN 3.6 07/18/2024    ALBUMIN 3.9 03/07/2024    ALBUMIN 3.7 03/21/2023     Lab Results   Component Value Date    BILITOT 0.2 07/18/2024    BILITOT 0.2 03/07/2024    BILITOT 0.2 03/21/2023     Lab Results   Component Value Date    AST 16  07/18/2024    AST 15 03/07/2024    AST 16 03/21/2023     Lab Results   Component Value Date    ALT 14 07/18/2024    ALT 13 03/07/2024    ALT 16 03/21/2023     Lab Results   Component Value Date    ANIONGAP 6 (L) 07/18/2024    ANIONGAP 10 03/07/2024    ANIONGAP 7 (L) 03/21/2023     Lab Results   Component Value Date    CREATININE 0.9 07/18/2024    CREATININE 0.8 03/07/2024    CREATININE 0.9 03/21/2023     Lab Results   Component Value Date    EGFRNORACEVR >60.0 07/18/2024    EGFRNORACEVR >60.0 03/07/2024    EGFRNORACEVR >60.0 03/21/2023     Assessment/Plan  -Pt qualifies for GLP-1 RA therapy based on BMI greater than or equal to 30 kg/m2  -Initiate Wegovy 0.25 mg once weekly for 1 month  -Then increase to Wegovy 0.5 mg once weekly for 1 month  -Then increase to Wegovy 1 mg once weekly  -RTC in 3 months    Patient consented to pharmacist management via collaborative practice.

## 2024-10-22 ENCOUNTER — OFFICE VISIT (OUTPATIENT)
Dept: INTERNAL MEDICINE | Facility: CLINIC | Age: 39
End: 2024-10-22
Payer: COMMERCIAL

## 2024-10-22 ENCOUNTER — PATIENT MESSAGE (OUTPATIENT)
Dept: INTERNAL MEDICINE | Facility: CLINIC | Age: 39
End: 2024-10-22

## 2024-10-22 DIAGNOSIS — E66.9 OBESITY, UNSPECIFIED CLASS, UNSPECIFIED OBESITY TYPE, UNSPECIFIED WHETHER SERIOUS COMORBIDITY PRESENT: Primary | ICD-10-CM

## 2024-10-22 PROCEDURE — 99499 UNLISTED E&M SERVICE: CPT | Mod: 95,,,

## 2024-10-22 RX ORDER — SEMAGLUTIDE 0.25 MG/.5ML
0.25 INJECTION, SOLUTION SUBCUTANEOUS
Qty: 2 ML | Refills: 0 | Status: ACTIVE | OUTPATIENT
Start: 2024-10-22

## 2024-10-22 RX ORDER — SEMAGLUTIDE 0.5 MG/.5ML
0.5 INJECTION, SOLUTION SUBCUTANEOUS
Qty: 2 ML | Refills: 0 | Status: ACTIVE | OUTPATIENT
Start: 2024-10-22

## 2024-10-22 RX ORDER — SEMAGLUTIDE 1 MG/.5ML
1 INJECTION, SOLUTION SUBCUTANEOUS
Qty: 2 ML | Refills: 0 | Status: ACTIVE | OUTPATIENT
Start: 2024-10-22

## 2024-10-30 ENCOUNTER — PATIENT MESSAGE (OUTPATIENT)
Dept: OTHER | Facility: OTHER | Age: 39
End: 2024-10-30
Payer: COMMERCIAL

## 2024-11-25 ENCOUNTER — PATIENT MESSAGE (OUTPATIENT)
Dept: ADMINISTRATIVE | Facility: OTHER | Age: 39
End: 2024-11-25
Payer: COMMERCIAL

## 2024-11-25 DIAGNOSIS — E66.9 OBESITY, UNSPECIFIED CLASS, UNSPECIFIED OBESITY TYPE, UNSPECIFIED WHETHER SERIOUS COMORBIDITY PRESENT: ICD-10-CM

## 2024-11-25 RX ORDER — SEMAGLUTIDE 0.25 MG/.5ML
0.25 INJECTION, SOLUTION SUBCUTANEOUS
Qty: 2 ML | Refills: 0 | Status: CANCELLED | OUTPATIENT
Start: 2024-11-25

## 2024-12-17 DIAGNOSIS — F41.1 GENERALIZED ANXIETY DISORDER: ICD-10-CM

## 2024-12-17 RX ORDER — PROPRANOLOL HYDROCHLORIDE 10 MG/1
TABLET ORAL
Qty: 30 TABLET | Refills: 2 | Status: CANCELLED | OUTPATIENT
Start: 2024-12-17

## 2024-12-17 RX ORDER — PROPRANOLOL HYDROCHLORIDE 10 MG/1
10 TABLET ORAL DAILY
Qty: 30 TABLET | Refills: 2 | Status: SHIPPED | OUTPATIENT
Start: 2024-12-17

## 2024-12-17 NOTE — TELEPHONE ENCOUNTER
No care due was identified.  [facility] Embedded Care Due Messages. Reference number: [id number].   12/17/2024 12:57:24 AM CST

## 2024-12-17 NOTE — TELEPHONE ENCOUNTER
Refill Routing Note   Medication(s) are not appropriate for processing by [facility] for the following reason(s):        Outside of protocol    [facility] action(s):  Route        Medication Therapy Plan: PRN USAGE FOR ANXIETY      Appointments  past 12m or future 3m with PCP    Date Provider   Last Visit   7/18/2024 Diamond Vasquez MD   Next Visit   12/19/2024 Diamond Vasquez MD   ED visits in past 90 days: 0        Note composed:1:06 PM 12/17/2024

## 2024-12-17 NOTE — TELEPHONE ENCOUNTER
No care due was identified.  [facility] Embedded Care Due Messages. Reference number: [id number].   12/17/2024 10:07:00 AM CST

## 2024-12-19 ENCOUNTER — OFFICE VISIT (OUTPATIENT)
Dept: INTERNAL MEDICINE | Facility: CLINIC | Age: 39
End: 2024-12-19
Payer: COMMERCIAL

## 2024-12-19 VITALS
WEIGHT: 226.88 LBS | BODY MASS INDEX: 41.75 KG/M2 | RESPIRATION RATE: 16 BRPM | OXYGEN SATURATION: 96 % | DIASTOLIC BLOOD PRESSURE: 80 MMHG | HEART RATE: 85 BPM | HEIGHT: 62 IN | SYSTOLIC BLOOD PRESSURE: 136 MMHG

## 2024-12-19 DIAGNOSIS — E66.01 MORBID OBESITY WITH BMI OF 40.0-44.9, ADULT: ICD-10-CM

## 2024-12-19 DIAGNOSIS — I10 ESSENTIAL HYPERTENSION: Primary | ICD-10-CM

## 2024-12-19 DIAGNOSIS — F41.1 GENERALIZED ANXIETY DISORDER: ICD-10-CM

## 2024-12-19 PROCEDURE — 3079F DIAST BP 80-89 MM HG: CPT | Mod: CPTII,S$GLB,, | Performed by: INTERNAL MEDICINE

## 2024-12-19 PROCEDURE — 3008F BODY MASS INDEX DOCD: CPT | Mod: CPTII,S$GLB,, | Performed by: INTERNAL MEDICINE

## 2024-12-19 PROCEDURE — 1160F RVW MEDS BY RX/DR IN RCRD: CPT | Mod: CPTII,S$GLB,, | Performed by: INTERNAL MEDICINE

## 2024-12-19 PROCEDURE — 1159F MED LIST DOCD IN RCRD: CPT | Mod: CPTII,S$GLB,, | Performed by: INTERNAL MEDICINE

## 2024-12-19 PROCEDURE — 3075F SYST BP GE 130 - 139MM HG: CPT | Mod: CPTII,S$GLB,, | Performed by: INTERNAL MEDICINE

## 2024-12-19 PROCEDURE — 99999 PR PBB SHADOW E&M-EST. PATIENT-LVL IV: CPT | Mod: PBBFAC,,, | Performed by: INTERNAL MEDICINE

## 2024-12-19 PROCEDURE — 99214 OFFICE O/P EST MOD 30 MIN: CPT | Mod: S$GLB,,, | Performed by: INTERNAL MEDICINE

## 2024-12-19 RX ORDER — LABETALOL 300 MG/1
300 TABLET, FILM COATED ORAL 2 TIMES DAILY
Qty: 60 TABLET | Refills: 2 | Status: SHIPPED | OUTPATIENT
Start: 2024-12-19

## 2024-12-19 NOTE — PROGRESS NOTES
Subjective:     Kady Mares is a 39 y.o. female who presents for   Chief Complaint   Patient presents with    Follow-up    Hypertension    Obesity    Anxiety       Hypertension  This is a chronic problem. The current episode started more than 1 year ago. The problem has been gradually improving since onset. The problem is resistant. Associated symptoms include anxiety, chest pain (has an appointment with Cardiology) and shortness of breath. Pertinent negatives include no headaches, malaise/fatigue, orthopnea, palpitations, peripheral edema, PND or sweats. Agents associated with hypertension include NSAIDs. Risk factors for coronary artery disease include family history and obesity. Past treatments include beta blockers. The current treatment provides mild improvement. Compliance problems include exercise and medication side effects.      BP Readings from Last 3 Encounters:   12/19/24 136/80   10/03/24 (!) 142/91   09/25/24 (!) 178/99       Anxiety: She presents for follow up of anxiety disorder. Current symptoms: none. She denies current suicidal and homicidal ideation. She complains of the following side effects from the treatment: none.    Obesity:  Body mass index is 41.49 kg/m².  Wt Readings from Last 3 Encounters:   12/19/24 102.9 kg (226 lb 13.7 oz)   10/03/24 104.5 kg (230 lb 6.1 oz)   09/12/24 103.3 kg (227 lb 11.8 oz)   - in [facility], on semaglutide  - lost a few pounds, reports nausea with dose changes, mild constipation  - bought a walking pad but has not used it very often  - Bought a walking pad tried once      Review of Systems   Constitutional:  Negative for chills, diaphoresis, fever and malaise/fatigue.   HENT:  Negative for congestion, ear pain, postnasal drip, sinus pressure and sore throat.    Eyes:  Negative for discharge and visual disturbance.   Respiratory:  Positive for shortness of breath. Negative for cough.    Cardiovascular:  Positive for  chest pain (has an appointment with Cardiology). Negative for palpitations, orthopnea, leg swelling and PND.   Gastrointestinal:  Positive for abdominal distention (with gas at times), constipation (mild) and nausea (mild nausea with semaglutide dose changes). Negative for abdominal pain, diarrhea and vomiting.   Musculoskeletal:  Negative for arthralgias and myalgias.   Skin:  Negative for rash and wound.   Neurological:  Negative for dizziness, tremors, numbness and headaches.   Psychiatric/Behavioral:  Negative for dysphoric mood. The patient is nervous/anxious.         Stress related to mother's recent illnesses, feels content with the match process        Answers submitted by the patient for this visit:  High Blood Pressure Questionnaire (Submitted on 12/17/2024)  Chief Complaint: Hypertension  Chronicity: chronic  Onset: more than 1 year ago  Progression since onset: gradually improving  Condition status: resistant  anxiety: Yes  chest pain: Yes  malaise/fatigue: No  orthopnea: No  palpitations: No  peripheral edema: No  PND: No  shortness of breath: Yes  sweats: No  Agents associated with hypertension: NSAIDs  CAD risks: family history, obesity  Compliance problems: exercise, medication side effects  Past treatments: beta blockers  Improvement on treatment: mild      Objective:     Physical Exam  Vitals reviewed.   Constitutional:       General: She is awake. She is not in acute distress.     Appearance: Normal appearance. She is well-developed and well-groomed.   HENT:      Head: Normocephalic and atraumatic.      Right Ear: Hearing and external ear normal.      Left Ear: Hearing and external ear normal.      Nose: Nose normal. No congestion.      Mouth/Throat:      Mouth: Mucous membranes are moist.   Eyes:      General: Lids are normal. Vision grossly intact.   Cardiovascular:      Rate and Rhythm: Normal rate and regular rhythm.      Heart sounds: Normal heart sounds. No murmur heard.  Pulmonary:       Effort: Pulmonary effort is normal.      Breath sounds: Normal breath sounds. No decreased breath sounds or wheezing.   Abdominal:      General: Bowel sounds are normal. There is no distension.   Musculoskeletal:         General: Normal range of motion.      Cervical back: Normal range of motion.      Right lower leg: No edema.      Left lower leg: No edema.   Skin:     General: Skin is warm and dry.      Findings: No lesion or rash.   Neurological:      Mental Status: She is alert and oriented to person, place, and time.   Psychiatric:         Attention and Perception: Attention normal.         Mood and Affect: Mood normal.         Behavior: Behavior is cooperative.            Assessment:      1. Essential hypertension    2. Generalized anxiety disorder    3. Morbid obesity with BMI of 40.0-44.9, adult           Plan:     1. Essential hypertension  - controlled, continue current regimen  - labetaloL (NORMODYNE) 300 MG tablet; Take 1 tablet (300 mg total) by mouth 2 (two) times daily.  Dispense: 60 tablet; Refill: 2    2. Generalized anxiety disorder  - mild improvement, continue effexor    3. Morbid obesity with BMI of 40.0-44.9, adult  - on semaglutide, encouraged increase in physical activity    RTC in 2 months for follow-up or sooner if needed    __________________________    Diamond Vasquez MD, PharmD  [facility]  American Board of Obesity Medicine diplomate  Office [phone]

## 2025-01-15 ENCOUNTER — LAB VISIT (OUTPATIENT)
Dept: LAB | Facility: HOSPITAL | Age: 40
End: 2025-01-15
Attending: INTERNAL MEDICINE
Payer: COMMERCIAL

## 2025-01-15 DIAGNOSIS — I10 ESSENTIAL HYPERTENSION: ICD-10-CM

## 2025-01-15 LAB
ALBUMIN SERPL BCP-MCNC: 3.7 G/DL (ref 3.5–5.2)
ALP SERPL-CCNC: 72 U/L (ref 40–150)
ALT SERPL W/O P-5'-P-CCNC: 16 U/L (ref 10–44)
ANION GAP SERPL CALC-SCNC: 7 MMOL/L (ref 8–16)
AST SERPL-CCNC: 17 U/L (ref 10–40)
BILIRUB SERPL-MCNC: 0.2 MG/DL (ref 0.1–1)
BUN SERPL-MCNC: 12 MG/DL (ref 6–20)
CALCIUM SERPL-MCNC: 9 MG/DL (ref 8.7–10.5)
CHLORIDE SERPL-SCNC: 106 MMOL/L (ref 95–110)
CHOLEST SERPL-MCNC: 202 MG/DL (ref 120–199)
CHOLEST/HDLC SERPL: 4 {RATIO} (ref 2–5)
CO2 SERPL-SCNC: 27 MMOL/L (ref 23–29)
CREAT SERPL-MCNC: 0.8 MG/DL (ref 0.5–1.4)
EST. GFR  (NO RACE VARIABLE): >60 ML/MIN/1.73 M^2
GLUCOSE SERPL-MCNC: 95 MG/DL (ref 70–110)
HDLC SERPL-MCNC: 50 MG/DL (ref 40–75)
HDLC SERPL: 24.8 % (ref 20–50)
LDLC SERPL CALC-MCNC: 140.6 MG/DL (ref 63–159)
NONHDLC SERPL-MCNC: 152 MG/DL
POTASSIUM SERPL-SCNC: 4.1 MMOL/L (ref 3.5–5.1)
PROT SERPL-MCNC: 7.1 G/DL (ref 6–8.4)
SODIUM SERPL-SCNC: 140 MMOL/L (ref 136–145)
TRIGL SERPL-MCNC: 57 MG/DL (ref 30–150)

## 2025-01-15 PROCEDURE — 80053 COMPREHEN METABOLIC PANEL: CPT | Performed by: INTERNAL MEDICINE

## 2025-01-15 PROCEDURE — 36415 COLL VENOUS BLD VENIPUNCTURE: CPT | Performed by: INTERNAL MEDICINE

## 2025-01-15 PROCEDURE — 80061 LIPID PANEL: CPT | Performed by: INTERNAL MEDICINE

## 2025-01-27 DIAGNOSIS — E66.9 OBESITY, UNSPECIFIED CLASS, UNSPECIFIED OBESITY TYPE, UNSPECIFIED WHETHER SERIOUS COMORBIDITY PRESENT: ICD-10-CM

## 2025-01-27 RX ORDER — SEMAGLUTIDE 1 MG/.5ML
1 INJECTION, SOLUTION SUBCUTANEOUS
Qty: 2 ML | Refills: 0 | Status: ACTIVE | OUTPATIENT
Start: 2025-01-27

## 2025-02-12 NOTE — PROGRESS NOTES
Patient ID: Kady Mares is a 39 y.o. Black or  female    Subjective  Chief Complaint: patient presents for medical weight loss management.    Co-morbidities: HTN, prediabetes    HPI: [facility] and is currently managed on Wegovy 1 mg.    Tolerance to current therapy:  Denies nausea, vomiting, diarrhea, constipation, abdominal pain    Weight loss history:  Starting weight:    9/27/2024   Recent Readings    Weight (lbs) 228 lb    BMI 41.7 BMI    Current weight:    2/13/2025   Recent Readings    Weight (lbs) 213 lb    BMI 38.95 BMI    % weight loss since GLP-1 initiation: 6.6 %    Objective  Lab Results   Component Value Date     01/15/2025     07/18/2024     03/07/2024     Lab Results   Component Value Date    K 4.1 01/15/2025    K 4.4 07/18/2024    K 4.0 03/07/2024     Lab Results   Component Value Date     01/15/2025     07/18/2024     03/07/2024     Lab Results   Component Value Date    CO2 27 01/15/2025    CO2 29 07/18/2024    CO2 26 03/07/2024     Lab Results   Component Value Date    BUN 12 01/15/2025    BUN 12 07/18/2024    BUN 16 03/07/2024     Lab Results   Component Value Date    GLU 95 01/15/2025     07/18/2024     03/07/2024     Lab Results   Component Value Date    CALCIUM 9.0 01/15/2025    CALCIUM 9.3 07/18/2024    CALCIUM 10.1 03/07/2024     Lab Results   Component Value Date    PROT 7.1 01/15/2025    PROT 6.9 07/18/2024    PROT 7.6 03/07/2024     Lab Results   Component Value Date    ALBUMIN 3.7 01/15/2025    ALBUMIN 3.6 07/18/2024    ALBUMIN 3.9 03/07/2024     Lab Results   Component Value Date    BILITOT 0.2 01/15/2025    BILITOT 0.2 07/18/2024    BILITOT 0.2 03/07/2024     Lab Results   Component Value Date    AST 17 01/15/2025    AST 16 07/18/2024    AST 15 03/07/2024     Lab Results   Component Value Date    ALT 16 01/15/2025    ALT 14 07/18/2024    ALT 13  03/07/2024     Lab Results   Component Value Date    ANIONGAP 7 (L) 01/15/2025    ANIONGAP 6 (L) 07/18/2024    ANIONGAP 10 03/07/2024     Lab Results   Component Value Date    CREATININE 0.8 01/15/2025    CREATININE 0.9 07/18/2024    CREATININE 0.8 03/07/2024     Lab Results   Component Value Date    EGFRNORACEVR >60.0 01/15/2025    EGFRNORACEVR >60.0 07/18/2024    EGFRNORACEVR >60.0 03/07/2024     Assessment/Plan  - Increase to Wegovy 1.7 mg x 4 weeks once completed with Wegovy 1 mg  - Then increase to Wegovy 2.4 mg SQ weekly  - RTC in 3 months for follow-up evaluation      Patient consented to pharmacist management via collaborative practice.

## 2025-02-13 ENCOUNTER — PATIENT MESSAGE (OUTPATIENT)
Dept: INTERNAL MEDICINE | Facility: CLINIC | Age: 40
End: 2025-02-13

## 2025-02-13 ENCOUNTER — OFFICE VISIT (OUTPATIENT)
Dept: INTERNAL MEDICINE | Facility: CLINIC | Age: 40
End: 2025-02-13
Payer: COMMERCIAL

## 2025-02-13 DIAGNOSIS — E66.01 OBESITY, CLASS III, BMI 40-49.9 (MORBID OBESITY): Primary | ICD-10-CM

## 2025-02-13 RX ORDER — SEMAGLUTIDE 1.7 MG/.75ML
1.7 INJECTION, SOLUTION SUBCUTANEOUS
Qty: 3 ML | Refills: 0 | Status: ACTIVE | OUTPATIENT
Start: 2025-02-13

## 2025-02-13 RX ORDER — SEMAGLUTIDE 2.4 MG/.75ML
2.4 INJECTION, SOLUTION SUBCUTANEOUS
Qty: 3 ML | Refills: 2 | Status: ACTIVE | OUTPATIENT
Start: 2025-02-13

## 2025-02-20 ENCOUNTER — OFFICE VISIT (OUTPATIENT)
Dept: INTERNAL MEDICINE | Facility: CLINIC | Age: 40
End: 2025-02-20
Payer: COMMERCIAL

## 2025-02-20 VITALS
HEART RATE: 107 BPM | OXYGEN SATURATION: 99 % | HEIGHT: 62 IN | RESPIRATION RATE: 20 BRPM | SYSTOLIC BLOOD PRESSURE: 120 MMHG | TEMPERATURE: 98 F | DIASTOLIC BLOOD PRESSURE: 88 MMHG | BODY MASS INDEX: 39.92 KG/M2 | WEIGHT: 216.94 LBS

## 2025-02-20 DIAGNOSIS — E66.01 SEVERE OBESITY (BMI 35.0-39.9) WITH COMORBIDITY: ICD-10-CM

## 2025-02-20 DIAGNOSIS — I10 ESSENTIAL HYPERTENSION: Primary | ICD-10-CM

## 2025-02-20 DIAGNOSIS — R73.03 PREDIABETES: ICD-10-CM

## 2025-02-20 DIAGNOSIS — F41.1 GENERALIZED ANXIETY DISORDER: ICD-10-CM

## 2025-02-20 DIAGNOSIS — Z00.00 ANNUAL PHYSICAL EXAM: Primary | ICD-10-CM

## 2025-02-20 PROBLEM — R03.0 ELEVATED BLOOD PRESSURE READING: Status: RESOLVED | Noted: 2022-02-28 | Resolved: 2025-02-20

## 2025-02-20 PROCEDURE — 3079F DIAST BP 80-89 MM HG: CPT | Mod: CPTII,S$GLB,, | Performed by: INTERNAL MEDICINE

## 2025-02-20 PROCEDURE — 1159F MED LIST DOCD IN RCRD: CPT | Mod: CPTII,S$GLB,, | Performed by: INTERNAL MEDICINE

## 2025-02-20 PROCEDURE — 99999 PR PBB SHADOW E&M-EST. PATIENT-LVL IV: CPT | Mod: PBBFAC,,, | Performed by: INTERNAL MEDICINE

## 2025-02-20 PROCEDURE — 3008F BODY MASS INDEX DOCD: CPT | Mod: CPTII,S$GLB,, | Performed by: INTERNAL MEDICINE

## 2025-02-20 PROCEDURE — 99214 OFFICE O/P EST MOD 30 MIN: CPT | Mod: S$GLB,,, | Performed by: INTERNAL MEDICINE

## 2025-02-20 PROCEDURE — 3044F HG A1C LEVEL LT 7.0%: CPT | Mod: CPTII,S$GLB,, | Performed by: INTERNAL MEDICINE

## 2025-02-20 PROCEDURE — 1160F RVW MEDS BY RX/DR IN RCRD: CPT | Mod: CPTII,S$GLB,, | Performed by: INTERNAL MEDICINE

## 2025-02-20 PROCEDURE — 3074F SYST BP LT 130 MM HG: CPT | Mod: CPTII,S$GLB,, | Performed by: INTERNAL MEDICINE

## 2025-02-20 NOTE — PROGRESS NOTES
Subjective:     Kady Mares is a 40 y.o. female who presents for   Chief Complaint   Patient presents with    Follow-up    Hypertension    Anxiety    Obesity   The patient is here with her 2 dogs.    HPI    Hypertension: The patient has been taking medications as instructed, no medication side effects noted, no chest pain on exertion, no dyspnea on exertion, and no swelling of ankles.    BP has been better controlled with an increase in the dose of labetalol.    BP Readings from Last 3 Encounters:   02/20/25 120/88   12/19/24 136/80   10/03/24 (!) 142/91       Anxiety: She presents for follow up of anxiety disorder. Current symptoms: none. She denies current suicidal and homicidal ideation. She complains of the following side effects from the treatment: none.      Obesity:  Body mass index is 39.68 kg/m².  Wt Readings from Last 3 Encounters:   02/20/25 98.4 kg (216 lb 14.9 oz)   12/19/24 102.9 kg (226 lb 13.7 oz)   10/03/24 104.5 kg (230 lb 6.1 oz)   - joined [facility] and takes semaglutide  - plans to start using her walking pad  - only eats once daily and she is not hungry---> advised eating at regular intervals to avoid hypoglycemia    >>Match day soon      Review of Systems   Constitutional:  Negative for chills, diaphoresis, fatigue and fever.   HENT:  Negative for congestion, ear pain and sinus pressure.    Eyes:  Negative for discharge and redness.   Respiratory:  Negative for cough, chest tightness and shortness of breath.    Cardiovascular:  Negative for chest pain, palpitations and leg swelling.        No recent episodes of chest discomfort   Gastrointestinal:  Negative for abdominal pain, constipation, diarrhea, nausea and vomiting.        Mild nausea when increasing the dose of Wegovy from 0.25mg to 0.5mg but it resolved quickly   Endocrine: Negative for polydipsia and polyuria.   Musculoskeletal:  Negative for arthralgias and myalgias.   Skin:  Negative for rash and  wound.   Neurological:  Negative for dizziness, tremors and headaches.   Psychiatric/Behavioral:  Negative for dysphoric mood. The patient is nervous/anxious (reports less stress at work due to better staffing, improved mood).           Objective:     Physical Exam  Vitals reviewed.   Constitutional:       General: She is awake. She is not in acute distress.     Appearance: Normal appearance. She is well-developed and well-groomed.   HENT:      Head: Normocephalic and atraumatic.      Right Ear: Hearing and external ear normal.      Left Ear: Hearing and external ear normal.      Nose: Nose normal. No congestion.      Mouth/Throat:      Mouth: Mucous membranes are moist.   Eyes:      General: Lids are normal. Vision grossly intact.   Cardiovascular:      Rate and Rhythm: Normal rate and regular rhythm.      Heart sounds: Normal heart sounds. No murmur heard.  Pulmonary:      Effort: Pulmonary effort is normal.      Breath sounds: Normal breath sounds. No decreased breath sounds or wheezing.   Abdominal:      General: Bowel sounds are normal. There is no distension.   Musculoskeletal:         General: Normal range of motion.      Cervical back: Normal range of motion.      Right lower leg: No edema.      Left lower leg: No edema.   Skin:     General: Skin is warm and dry.      Findings: No lesion or rash.   Neurological:      Mental Status: She is alert and oriented to person, place, and time.   Psychiatric:         Attention and Perception: Attention normal.         Mood and Affect: Mood normal.         Behavior: Behavior is cooperative.            Assessment:      1. Essential hypertension    2. Generalized anxiety disorder    3. Prediabetes    4. Severe obesity (BMI 35.0-39.9) with comorbidity           Plan:     1. Essential hypertension  - controlled, continue labetalol    2. Generalized anxiety disorder  - improved with Effexor XR daily and propranolol prn    3. Prediabetes  - last HbA1c was 5.9, will improve  with semaglutide treatment    4. Severe obesity (BMI 35.0-39.9) with comorbidity  - continue to increase activity level, eat at regular intervals, will monitor progress    RTC in May 2025 for annual exam or sooner if needed    __________________________    Diamond Vasquez MD, PharmD  [facility]  American Board of Obesity Medicine diplomate  Office [phone]

## 2025-02-23 ENCOUNTER — PATIENT MESSAGE (OUTPATIENT)
Dept: ADMINISTRATIVE | Facility: OTHER | Age: 40
End: 2025-02-23
Payer: COMMERCIAL

## 2025-03-21 ENCOUNTER — PATIENT MESSAGE (OUTPATIENT)
Dept: ADMINISTRATIVE | Facility: OTHER | Age: 40
End: 2025-03-21
Payer: COMMERCIAL

## 2025-04-16 ENCOUNTER — PATIENT MESSAGE (OUTPATIENT)
Dept: ADMINISTRATIVE | Facility: OTHER | Age: 40
End: 2025-04-16
Payer: COMMERCIAL

## 2025-04-16 DIAGNOSIS — F41.1 GENERALIZED ANXIETY DISORDER: ICD-10-CM

## 2025-04-17 ENCOUNTER — LAB VISIT (OUTPATIENT)
Dept: LAB | Facility: HOSPITAL | Age: 40
End: 2025-04-17
Attending: INTERNAL MEDICINE
Payer: COMMERCIAL

## 2025-04-17 DIAGNOSIS — Z00.00 ANNUAL PHYSICAL EXAM: ICD-10-CM

## 2025-04-17 LAB
25(OH)D3+25(OH)D2 SERPL-MCNC: 26 NG/ML (ref 30–96)
ABSOLUTE EOSINOPHIL (OHS): 0.1 K/UL
ABSOLUTE MONOCYTE (OHS): 0.44 K/UL (ref 0.3–1)
ABSOLUTE NEUTROPHIL COUNT (OHS): 2.86 K/UL (ref 1.8–7.7)
ALBUMIN SERPL BCP-MCNC: 3.5 G/DL (ref 3.5–5.2)
ALP SERPL-CCNC: 61 UNIT/L (ref 40–150)
ALT SERPL W/O P-5'-P-CCNC: 11 UNIT/L (ref 10–44)
ANION GAP (OHS): 7 MMOL/L (ref 8–16)
AST SERPL-CCNC: 12 UNIT/L (ref 11–45)
BASOPHILS # BLD AUTO: 0.04 K/UL
BASOPHILS NFR BLD AUTO: 0.7 %
BILIRUB SERPL-MCNC: 0.2 MG/DL (ref 0.1–1)
BUN SERPL-MCNC: 13 MG/DL (ref 6–20)
CALCIUM SERPL-MCNC: 9.3 MG/DL (ref 8.7–10.5)
CHLORIDE SERPL-SCNC: 106 MMOL/L (ref 95–110)
CHOLEST SERPL-MCNC: 214 MG/DL (ref 120–199)
CHOLEST/HDLC SERPL: 4 {RATIO} (ref 2–5)
CO2 SERPL-SCNC: 25 MMOL/L (ref 23–29)
CREAT SERPL-MCNC: 0.9 MG/DL (ref 0.5–1.4)
EAG (OHS): 105 MG/DL (ref 68–131)
ERYTHROCYTE [DISTWIDTH] IN BLOOD BY AUTOMATED COUNT: 14.7 % (ref 11.5–14.5)
GFR SERPLBLD CREATININE-BSD FMLA CKD-EPI: >60 ML/MIN/1.73/M2
GLUCOSE SERPL-MCNC: 96 MG/DL (ref 70–110)
HBA1C MFR BLD: 5.3 % (ref 4–5.6)
HCT VFR BLD AUTO: 33.2 % (ref 37–48.5)
HDLC SERPL-MCNC: 53 MG/DL (ref 40–75)
HDLC SERPL: 24.8 % (ref 20–50)
HGB BLD-MCNC: 10.6 GM/DL (ref 12–16)
IMM GRANULOCYTES # BLD AUTO: 0 K/UL (ref 0–0.04)
IMM GRANULOCYTES NFR BLD AUTO: 0 % (ref 0–0.5)
LDLC SERPL CALC-MCNC: 146 MG/DL (ref 63–159)
LYMPHOCYTES # BLD AUTO: 2.51 K/UL (ref 1–4.8)
MCH RBC QN AUTO: 28.6 PG (ref 27–31)
MCHC RBC AUTO-ENTMCNC: 31.9 G/DL (ref 32–36)
MCV RBC AUTO: 90 FL (ref 82–98)
NONHDLC SERPL-MCNC: 161 MG/DL
NUCLEATED RBC (/100WBC) (OHS): 0 /100 WBC
PLATELET # BLD AUTO: 311 K/UL (ref 150–450)
PMV BLD AUTO: 11 FL (ref 9.2–12.9)
POTASSIUM SERPL-SCNC: 3.7 MMOL/L (ref 3.5–5.1)
PROT SERPL-MCNC: 6.6 GM/DL (ref 6–8.4)
RBC # BLD AUTO: 3.7 M/UL (ref 4–5.4)
RELATIVE EOSINOPHIL (OHS): 1.7 %
RELATIVE LYMPHOCYTE (OHS): 42.2 % (ref 18–48)
RELATIVE MONOCYTE (OHS): 7.4 % (ref 4–15)
RELATIVE NEUTROPHIL (OHS): 48 % (ref 38–73)
SODIUM SERPL-SCNC: 138 MMOL/L (ref 136–145)
TRIGL SERPL-MCNC: 75 MG/DL (ref 30–150)
TSH SERPL-ACNC: 1.87 UIU/ML (ref 0.4–4)
WBC # BLD AUTO: 5.95 K/UL (ref 3.9–12.7)

## 2025-04-17 PROCEDURE — 83036 HEMOGLOBIN GLYCOSYLATED A1C: CPT

## 2025-04-17 PROCEDURE — 82306 VITAMIN D 25 HYDROXY: CPT

## 2025-04-17 PROCEDURE — 84443 ASSAY THYROID STIM HORMONE: CPT

## 2025-04-17 PROCEDURE — 36415 COLL VENOUS BLD VENIPUNCTURE: CPT | Mod: PO

## 2025-04-17 PROCEDURE — 85025 COMPLETE CBC W/AUTO DIFF WBC: CPT

## 2025-04-17 PROCEDURE — 80061 LIPID PANEL: CPT

## 2025-04-17 PROCEDURE — 84520 ASSAY OF UREA NITROGEN: CPT

## 2025-04-17 RX ORDER — VENLAFAXINE HYDROCHLORIDE 150 MG/1
CAPSULE, EXTENDED RELEASE ORAL
Qty: 90 CAPSULE | Refills: 2 | Status: SHIPPED | OUTPATIENT
Start: 2025-04-17

## 2025-04-17 NOTE — TELEPHONE ENCOUNTER
Unable to retrieve patient chart and identify care due.  [facility] Embedded Care Due Messages. Reference number: [id number].   4/16/2025 10:30:03 PM CDT

## 2025-04-17 NOTE — TELEPHONE ENCOUNTER
Refill Decision Note   Kady Mares  is requesting a refill authorization.  Brief Assessment and Rationale for Refill:  Approve     Medication Therapy Plan:         Pharmacist review requested: Yes   Comments:     Note composed:5:32 AM 04/17/2025

## 2025-04-17 NOTE — TELEPHONE ENCOUNTER
Refill Routing Note   Medication(s) are not appropriate for processing by [facility] for the following reason(s):        Drug-disease interaction    [facility] action(s):  Defer      Medication Therapy Plan: Drug-Disease: venlafaxine and Essential hypertension    Pharmacist review requested: Yes     Appointments  past 12m or future 3m with PCP    Date Provider   Last Visit   2/20/2025 Diamond Vasquez MD   Next Visit   4/24/2025 Diamond Vasquez MD   ED visits in past 90 days: 0        Note composed:11:37 PM 04/16/2025

## 2025-04-24 ENCOUNTER — OFFICE VISIT (OUTPATIENT)
Dept: INTERNAL MEDICINE | Facility: CLINIC | Age: 40
End: 2025-04-24
Payer: COMMERCIAL

## 2025-04-24 VITALS
SYSTOLIC BLOOD PRESSURE: 120 MMHG | OXYGEN SATURATION: 99 % | DIASTOLIC BLOOD PRESSURE: 88 MMHG | BODY MASS INDEX: 36.87 KG/M2 | TEMPERATURE: 98 F | RESPIRATION RATE: 18 BRPM | HEIGHT: 62 IN | WEIGHT: 200.38 LBS | HEART RATE: 97 BPM

## 2025-04-24 DIAGNOSIS — E55.9 VITAMIN D INSUFFICIENCY: ICD-10-CM

## 2025-04-24 DIAGNOSIS — E78.2 MIXED HYPERLIPIDEMIA: ICD-10-CM

## 2025-04-24 DIAGNOSIS — Z12.31 ENCOUNTER FOR SCREENING MAMMOGRAM FOR HIGH-RISK PATIENT: ICD-10-CM

## 2025-04-24 DIAGNOSIS — F41.1 GENERALIZED ANXIETY DISORDER: ICD-10-CM

## 2025-04-24 DIAGNOSIS — I10 ESSENTIAL HYPERTENSION: ICD-10-CM

## 2025-04-24 DIAGNOSIS — E66.01 SEVERE OBESITY (BMI 35.0-39.9) WITH COMORBIDITY: ICD-10-CM

## 2025-04-24 DIAGNOSIS — R82.90 ABNORMAL URINE FINDINGS: ICD-10-CM

## 2025-04-24 DIAGNOSIS — B35.1 ONYCHOMYCOSIS: ICD-10-CM

## 2025-04-24 DIAGNOSIS — Z00.00 ANNUAL PHYSICAL EXAM: Primary | ICD-10-CM

## 2025-04-24 DIAGNOSIS — D64.9 ANEMIA, UNSPECIFIED TYPE: ICD-10-CM

## 2025-04-24 PROCEDURE — 99999 PR PBB SHADOW E&M-EST. PATIENT-LVL IV: CPT | Mod: PBBFAC,,, | Performed by: INTERNAL MEDICINE

## 2025-04-24 PROCEDURE — 1160F RVW MEDS BY RX/DR IN RCRD: CPT | Mod: CPTII,S$GLB,, | Performed by: INTERNAL MEDICINE

## 2025-04-24 PROCEDURE — 3074F SYST BP LT 130 MM HG: CPT | Mod: CPTII,S$GLB,, | Performed by: INTERNAL MEDICINE

## 2025-04-24 PROCEDURE — 3079F DIAST BP 80-89 MM HG: CPT | Mod: CPTII,S$GLB,, | Performed by: INTERNAL MEDICINE

## 2025-04-24 PROCEDURE — 3008F BODY MASS INDEX DOCD: CPT | Mod: CPTII,S$GLB,, | Performed by: INTERNAL MEDICINE

## 2025-04-24 PROCEDURE — 1159F MED LIST DOCD IN RCRD: CPT | Mod: CPTII,S$GLB,, | Performed by: INTERNAL MEDICINE

## 2025-04-24 PROCEDURE — 3044F HG A1C LEVEL LT 7.0%: CPT | Mod: CPTII,S$GLB,, | Performed by: INTERNAL MEDICINE

## 2025-04-24 PROCEDURE — 99396 PREV VISIT EST AGE 40-64: CPT | Mod: S$GLB,,, | Performed by: INTERNAL MEDICINE

## 2025-04-24 NOTE — PROGRESS NOTES
Subjective:     PCP: Diamond Vasquez MD Meaghen Elizabeth Maria Elena Mares is a 40 y.o. female who presents for an annual exam.    She has lost approximately 30 lbs from her highest weight of 230 lbs, attributing this to reduced appetite and decreased food intake. She currently follows a single meal pattern around 7 PM, which serves as both lunch and dinner. Her diet now includes lighter options such as pasta salads with kale and turkey sandwiches.    She reports hair breakage, particularly in the middle area of her scalp, which she attributes to rapid weight loss and possible telogen effluvium. She has independently started biotin supplements and expects the hair loss to stabilize once her weight stabilizes.    She reports increased anxiety and depression following unsuccessful residency match. She has increased her use of propranolol to manage anxiety symptoms. She uses sleep as a primary coping mechanism, sleeping whenever possible to avoid thinking about the situation. She previously engaged with a therapist during the initial residency application process but has not had therapeutic support recently.    She has a family history of heart disease. Her surgical history is significant for myomectomy, with possible adhesions causing cyclical pain in the area.    Total cholesterol is elevated with LDL at 146 mg/dL. CBC shows iron deficiency with slight decrease from previous levels. Vitamin D levels are improving but remain low, though approaching normal range. Trace protein was detected in urine.        Medical History:   Past Medical History:   Diagnosis Date    Mild depression 11/02/2018    Multiple thyroid nodules     Nephrolithiasis     Vertigo        Family History: family history includes Diabetes in her father and mother; Diabetes Mellitus in her brother; Heart attack in her mother; Hypertension in her father and mother; Kidney cancer in her father; Pacemaker/defibrilator in her mother; Prostate cancer  in her father and paternal uncle; Stroke in her mother.    Surgical History:   Past Surgical History:   Procedure Laterality Date    MYOMECTOMY  09/22/2015        Social History:  reports that she has never smoked. She has never used smokeless tobacco. She reports that she does not drink alcohol and does not use drugs.     Allergies:   Review of patient's allergies indicates:   Allergen Reactions    Wellbutrin [bupropion hcl] Itching       Medications:   Current Outpatient Medications   Medication Sig    atorvastatin (LIPITOR) 20 MG tablet Take 1 tablet (20 mg total) by mouth once daily.    b complex vitamins tablet Take 1 tablet by mouth once daily.    ergocalciferol (ERGOCALCIFEROL) 50,000 unit Cap Take 1 capsule (50,000 Units total) by mouth every 7 days.    fluticasone propionate (FLONASE) 50 mcg/actuation nasal spray Spray 2 sprays (100 mcg total) by Each Nostril route once daily.    labetaloL (NORMODYNE) 300 MG tablet Take 1 tablet (300 mg total) by mouth 2 (two) times daily.    propranoloL (INDERAL) 10 MG tablet Take 1 tablet by mouth daily as needed for anxiety.    semaglutide, weight loss, (WEGOVY) 2.4 mg/0.75 mL PnIj Inject 2.4 mg into the skin every 7 days.    venlafaxine (EFFEXOR-XR) 150 MG Cp24 Take 1 capsule by mouth every morning     No current facility-administered medications for this visit.       Health Maintenance:   Health Maintenance Topics with due status: Not Due       Topic Last Completion Date    TETANUS VACCINE 03/18/2022    Cervical Cancer Screening 03/13/2023    Hemoglobin A1c (Prediabetes) 04/17/2025    RSV Vaccine (Age 60+ and Pregnant patients) Not Due     Lab Results   Component Value Date    HEPCAB Negative 03/18/2022    [id number] Negative 03/18/2022     Eye Exam:   done  Dental Exam: due  OB/GYN: Dr. Bay  Pap smear: 3/20/2023  Mammogram: due  HIV: 3/2022, neg  Hepatitis C  Ab: 3/2022, neg    Vaccinations:  Immunization History   Administered Date(s) Administered    COVID-19  MRNA, LN-S PF (MODERNA HALF 0.25 ML DOSE) 01/16/2022    COVID-19, MRNA, LN-S, PF (MODERNA FULL 0.5 ML DOSE) 06/17/2021, 07/20/2021    Influenza - Quadrivalent - PF *Preferred* (6 months and older) 10/06/2022, 11/17/2023    Influenza - Trivalent - Fluarix, Flulaval, Fluzone, Afluria - PF 10/30/2024    Td - PF (ADULT) 03/18/2022     Influenza: done  Tetanus: 2022  Covid vaccine: boosted x1    Body mass index is 36.65 kg/m².  Wt Readings from Last 3 Encounters:   04/24/25 90.9 kg (200 lb 6.4 oz)   02/20/25 98.4 kg (216 lb 14.9 oz)   12/19/24 102.9 kg (226 lb 13.7 oz)   - lost 30 lbs, cannot eat as much, pasta salad or sandwiches  - she plans to start exercising on roller skates      Review of Systems   Constitutional:  Positive for appetite change (increased and likely related to stress). Negative for chills, diaphoresis, fatigue and fever.   HENT:  Negative for congestion, dental problem, ear discharge, ear pain, postnasal drip, rhinorrhea, sinus pressure, sore throat and trouble swallowing.    Eyes:  Negative for redness and visual disturbance.   Respiratory:  Negative for cough, chest tightness and shortness of breath.    Cardiovascular:  Negative for chest pain, palpitations and leg swelling.   Gastrointestinal:  Negative for abdominal pain, blood in stool, constipation, diarrhea, nausea and vomiting.   Endocrine: Negative for cold intolerance and heat intolerance.   Genitourinary:  Negative for decreased urine volume, dysuria, frequency and hematuria.   Musculoskeletal:  Negative for arthralgias, back pain and myalgias.   Skin:  Negative for rash and wound.        Hair breakage in the last few months. She started taking biotin.   Neurological:  Negative for dizziness, weakness, numbness and headaches.   Hematological:  Negative for adenopathy.   Psychiatric/Behavioral:  Negative for dysphoric mood and sleep disturbance. The patient is nervous/anxious.         More stress related to Match. She has seen a therapist  in the past.          Objective:     Physical Exam  Vitals reviewed.   Constitutional:       General: She is awake. She is not in acute distress.     Appearance: Normal appearance. She is well-developed and well-groomed. She is not diaphoretic.   HENT:      Head: Normocephalic and atraumatic.      Right Ear: Hearing, tympanic membrane, ear canal and external ear normal. Tympanic membrane is not erythematous or bulging.      Left Ear: Hearing, tympanic membrane, ear canal and external ear normal. Tympanic membrane is not erythematous or bulging.      Nose: Nose normal. No congestion.      Mouth/Throat:      Mouth: Mucous membranes are moist.      Tongue: No lesions.      Pharynx: Oropharynx is clear. Uvula midline. No oropharyngeal exudate or posterior oropharyngeal erythema.   Eyes:      General: Lids are normal. Vision grossly intact. Gaze aligned appropriately. No scleral icterus.     Conjunctiva/sclera:      Right eye: Right conjunctiva is not injected.      Left eye: Left conjunctiva is not injected.      Pupils: Pupils are equal, round, and reactive to light.   Neck:      Thyroid: No thyroid mass or thyromegaly.   Cardiovascular:      Rate and Rhythm: Normal rate and regular rhythm.      Pulses: Normal pulses.      Heart sounds: Normal heart sounds. No murmur heard.  Pulmonary:      Effort: Pulmonary effort is normal. No respiratory distress.      Breath sounds: Normal breath sounds. No decreased breath sounds or wheezing.   Abdominal:      General: Bowel sounds are normal. There is no distension.      Palpations: Abdomen is soft. Abdomen is not rigid.      Tenderness: There is no abdominal tenderness. There is no guarding or rebound.   Musculoskeletal:         General: Normal range of motion.      Cervical back: Normal range of motion and neck supple.      Right lower leg: No edema.      Left lower leg: No edema.   Lymphadenopathy:      Cervical: No cervical adenopathy.      Upper Body:      Right upper body:  No supraclavicular adenopathy.      Left upper body: No supraclavicular adenopathy.   Skin:     General: Skin is warm and dry.      Coloration: Skin is not cyanotic.      Findings: No lesion or rash.      Nails: There is no clubbing.      Comments: Fingernail is thickened, dark and discolored   Neurological:      General: No focal deficit present.      Mental Status: She is alert and oriented to person, place, and time.      Sensory: Sensation is intact.      Coordination: Coordination is intact.      Gait: Gait is intact.      Deep Tendon Reflexes: Reflexes are normal and symmetric.   Psychiatric:         Attention and Perception: Attention normal.         Mood and Affect: Mood normal.         Behavior: Behavior is cooperative.              Assessment:        1. Annual physical exam    2. Essential hypertension    3. Generalized anxiety disorder    4. Mixed hyperlipidemia    5. Anemia, unspecified type    6. Vitamin D insufficiency    7. Onychomycosis    8. Abnormal urine findings    9. Severe obesity (BMI 35.0-39.9) with comorbidity    10. Encounter for screening mammogram for high-risk patient           Plan:       - Assessed weight loss progress, noting hair breakage related to rapid weight loss.  - Evaluated emotional state following problems with residency match, considering impact on eating habits and medication effectiveness.  - Reviewed abnormal lab results, including elevated cholesterol and unusual CBC findings.  - Considered iron deficiency as potential cause of CBC abnormalities.  - Assessed cardiovascular risk factors, including family history of heart disease.  - Evaluated protein intake and meal frequency, noting potential nutritional deficiencies.  - Considered dermatological issues, noting previous effective treatment with Jublia for nail fungus.    HYPERTENSION:   Blood pressure remains controlled with use of labetalol.    Advised to continue the current antihypertensive regimen.    DEPRESSION AND  ANXIETY:   Noted increased anxiety and depressive symptoms due to problem with matching.     Observed that the patient reports increased sleep as a coping mechanism.   Recommend reconnecting with a therapist to address the current emotional state.   Continued the use of propranolol for managing anxiety symptoms.    HYPERLIPIDEMIA:   Observed that the patient's total cholesterol and LDL levels have increased, with LDL level at 146, the highest it has been.   Ordered lipoprotein A level to assess cardiovascular risk.   Reiterated the previous recommendation to start a statin, which the patient has not yet initiated.   Advised dietary modifications to potentially lower cholesterol levels.    IRON DEFICIENCY ANEMIA:   Advised taking a multivitamin that contains iron and take it every day.    VITAMIN D DEFICIENCY:   Noted that the patient's vitamin D levels are still low but improving.   Instructed the patient to continue taking vitamin D supplements consistently.    WEIGHT LOSS:   Documented a weight loss of approximately 30 lbs since starting semaglutide, with current weight around 200 lbs, down from a highest weight of 230 lbs.   Assessed the patient's eating habits and frequency.   Recommend adding protein to the diet and spacing out meals.   Discussed importance of regular protein intake and meal spacing for nutritional balance.   [place] to add protein sources to pasta meals, such as chicken pieces in pasta or protein shakes.    POSSIBLE PROTEINURIA:   Noted trace protein in the patient's urine test.   Ordered a specific urine protein test to confirm results.   Will repeat the urine test for confirmation.    FOLLOW-UP AND PREVENTIVE CARE:   Educated on the need for mammogram screening   at age 40.   Ordered mammogram.   Provided information on measles and MMR vaccination importance.         RTC in 4 months for follow-up or sooner if needed    __________________________    Diamond Vasquez MD, PharmD  [facility]  American Board of Obesity Medicine diplomate  Office [phone]

## 2025-04-30 NOTE — PROGRESS NOTES
Patient ID: Kady Mares is a 40 y.o. Black or  female    Subjective  Chief Complaint: patient presents for medical weight loss management.    Co-morbidities: HTN, prediabetes    HPI: Patient started [facility] with [facility] in October 2024 and is currently managed on Wegovy 2.4 mg.    Tolerance to current therapy:  Denies nausea, vomiting, abdominal pain  Endorses diarrhea, constipation manageable    Weight loss history:  Starting weight:    9/27/2024   Recent Readings    Weight (lbs) 228 lb    BMI 41.7 BMI    Current weight:    5/1/2025   Recent Readings    Weight (lbs) 199 lb    BMI 36.39 BMI    % weight loss since GLP-1 initiation: 12.7 %    Objective  Lab Results   Component Value Date     04/17/2025     01/15/2025     07/18/2024     Lab Results   Component Value Date    K 3.7 04/17/2025    K 4.1 01/15/2025    K 4.4 07/18/2024     Lab Results   Component Value Date     04/17/2025     01/15/2025     07/18/2024     Lab Results   Component Value Date    CO2 25 04/17/2025    CO2 27 01/15/2025    CO2 29 07/18/2024     Lab Results   Component Value Date    BUN 13 04/17/2025    BUN 12 01/15/2025    BUN 12 07/18/2024     Lab Results   Component Value Date    GLU 96 04/17/2025    GLU 95 01/15/2025     07/18/2024     Lab Results   Component Value Date    CALCIUM 9.3 04/17/2025    CALCIUM 9.0 01/15/2025    CALCIUM 9.3 07/18/2024     Lab Results   Component Value Date    PROT 6.6 04/17/2025    PROT 7.1 01/15/2025    PROT 6.9 07/18/2024     Lab Results   Component Value Date    ALBUMIN 3.5 04/17/2025    ALBUMIN 3.7 01/15/2025    ALBUMIN 3.6 07/18/2024     Lab Results   Component Value Date    BILITOT 0.2 04/17/2025    BILITOT 0.2 01/15/2025    BILITOT 0.2 07/18/2024     Lab Results   Component Value Date    AST 12 04/17/2025    AST 17 01/15/2025    AST 16 07/18/2024     Lab Results   Component Value Date    ALT 11 04/17/2025    ALT 16  01/15/2025    ALT 14 07/18/2024     Lab Results   Component Value Date    ANIONGAP 7 (L) 04/17/2025    ANIONGAP 7 (L) 01/15/2025    ANIONGAP 6 (L) 07/18/2024     Lab Results   Component Value Date    CREATININE 0.9 04/17/2025    CREATININE 0.8 01/15/2025    CREATININE 0.9 07/18/2024     Lab Results   Component Value Date    EGFRNORACEVR >60 04/17/2025    EGFRNORACEVR >60.0 01/15/2025    EGFRNORACEVR >60.0 07/18/2024     Assessment/Plan  - Continue Wegovy 2.4 mg SQ weekly  - RTC in 6 months for follow-up evaluation    Patient consented to pharmacist management via collaborative practice.

## 2025-05-01 ENCOUNTER — PATIENT MESSAGE (OUTPATIENT)
Dept: INTERNAL MEDICINE | Facility: CLINIC | Age: 40
End: 2025-05-01

## 2025-05-01 ENCOUNTER — HOSPITAL ENCOUNTER (OUTPATIENT)
Dept: RADIOLOGY | Facility: HOSPITAL | Age: 40
Discharge: HOME | End: 2025-05-01
Attending: INTERNAL MEDICINE
Payer: COMMERCIAL

## 2025-05-01 ENCOUNTER — OFFICE VISIT (OUTPATIENT)
Dept: INTERNAL MEDICINE | Facility: CLINIC | Age: 40
End: 2025-05-01
Payer: COMMERCIAL

## 2025-05-01 VITALS — BODY MASS INDEX: 36.8 KG/M2 | HEIGHT: 62 IN | WEIGHT: 200 LBS

## 2025-05-01 DIAGNOSIS — E66.812 OBESITY, CLASS II, BMI 35-39.9: Primary | ICD-10-CM

## 2025-05-01 DIAGNOSIS — Z12.31 ENCOUNTER FOR SCREENING MAMMOGRAM FOR HIGH-RISK PATIENT: ICD-10-CM

## 2025-05-01 PROCEDURE — 36415 COLL VENOUS BLD VENIPUNCTURE: CPT | Performed by: INTERNAL MEDICINE

## 2025-05-01 PROCEDURE — 77067 SCR MAMMO BI INCL CAD: CPT | Mod: 26,,, | Performed by: RADIOLOGY

## 2025-05-01 PROCEDURE — 77063 BREAST TOMOSYNTHESIS BI: CPT | Mod: TC

## 2025-05-01 PROCEDURE — 77063 BREAST TOMOSYNTHESIS BI: CPT | Mod: 26,,, | Performed by: RADIOLOGY

## 2025-05-01 RX ORDER — SEMAGLUTIDE 2.4 MG/.75ML
2.4 INJECTION, SOLUTION SUBCUTANEOUS
Qty: 3 ML | Refills: 5 | Status: ACTIVE | OUTPATIENT
Start: 2025-05-01

## 2025-05-15 ENCOUNTER — PATIENT MESSAGE (OUTPATIENT)
Dept: ADMINISTRATIVE | Facility: OTHER | Age: 40
End: 2025-05-15
Payer: COMMERCIAL

## 2025-05-15 DIAGNOSIS — F41.1 GENERALIZED ANXIETY DISORDER: ICD-10-CM

## 2025-05-15 DIAGNOSIS — E55.9 VITAMIN D INSUFFICIENCY: ICD-10-CM

## 2025-05-15 DIAGNOSIS — I10 ESSENTIAL HYPERTENSION: ICD-10-CM

## 2025-05-15 RX ORDER — ERGOCALCIFEROL 1.25 MG/1
50000 CAPSULE ORAL
Qty: 12 CAPSULE | Refills: 3 | Status: SHIPPED | OUTPATIENT
Start: 2025-05-15

## 2025-05-15 RX ORDER — PROPRANOLOL HYDROCHLORIDE 10 MG/1
10 TABLET ORAL DAILY
Qty: 90 TABLET | Refills: 3 | Status: CANCELLED | OUTPATIENT
Start: 2025-05-15

## 2025-05-15 RX ORDER — LABETALOL 300 MG/1
300 TABLET, FILM COATED ORAL 2 TIMES DAILY
Qty: 180 TABLET | Refills: 3 | Status: SHIPPED | OUTPATIENT
Start: 2025-05-15

## 2025-05-15 RX ORDER — ERGOCALCIFEROL 1.25 MG/1
50000 CAPSULE ORAL
Qty: 12 CAPSULE | Refills: 3 | Status: CANCELLED | OUTPATIENT
Start: 2025-05-15

## 2025-05-15 RX ORDER — PROPRANOLOL HYDROCHLORIDE 10 MG/1
10 TABLET ORAL DAILY
Qty: 90 TABLET | Refills: 3 | Status: SHIPPED | OUTPATIENT
Start: 2025-05-15

## 2025-05-15 NOTE — TELEPHONE ENCOUNTER
Refill Routing Note   Medication(s) are not appropriate for processing by [facility] for the following reason(s):        Outside of protocol    [facility] action(s):  Route        Medication Therapy Plan: Beta blockers for PRN use are off protocol; Vit D is off protocol      Appointments  past 12m or future 3m with PCP    Date Provider   Last Visit   4/24/2025 Diamond Vasquez MD   Next Visit   Visit date not found Diamond Vasquez MD   ED visits in past 90 days: 0        Note composed:1:29 PM 05/15/2025

## 2025-05-15 NOTE — TELEPHONE ENCOUNTER
Refill Routing Note   Medication(s) are not appropriate for processing by [facility] for the following reason(s):        Outside of protocol    [facility] action(s):  Route  Approve      Medication Therapy Plan: PRN usage outside [facility] protocol    Pharmacist review requested: Yes   Extended chart review required: Yes     Appointments  past 12m or future 3m with PCP    Date Provider   Last Visit   4/24/2025 Diamond Vasquez MD   Next Visit   Visit date not found Diamond Vasquez MD   ED visits in past 90 days: 0        Note composed:10:10 AM 05/15/2025

## 2025-05-15 NOTE — TELEPHONE ENCOUNTER
No care due was identified.  [facility] Embedded Care Due Messages. Reference number: [id number].   5/15/2025 1:12:29 PM CDT

## 2025-05-15 NOTE — TELEPHONE ENCOUNTER
No care due was identified.  [facility] Embedded Care Due Messages. Reference number: [id number].   5/15/2025 1:24:58 AM CDT

## 2025-05-15 NOTE — TELEPHONE ENCOUNTER
Refill Routing Note   Medication(s) are not appropriate for processing by [facility] for the following reason(s):        Drug-drug interaction  Outside of protocol    [facility] action(s):  Defer  Route        Medication Therapy Plan: Drug drug: propranoloL and labetaloL    Pharmacist review requested: Yes     Appointments  past 12m or future 3m with PCP    Date Provider   Last Visit   4/24/2025 Diamond Vasquez MD   Next Visit   Visit date not found Diamond Vasquez MD   ED visits in past 90 days: 0        Note composed:9:17 AM 05/15/2025

## 2025-06-13 ENCOUNTER — PATIENT MESSAGE (OUTPATIENT)
Dept: ADMINISTRATIVE | Facility: OTHER | Age: 40
End: 2025-06-13
Payer: COMMERCIAL

## 2025-07-10 ENCOUNTER — PATIENT MESSAGE (OUTPATIENT)
Dept: ADMINISTRATIVE | Facility: OTHER | Age: 40
End: 2025-07-10
Payer: COMMERCIAL

## 2025-07-11 DIAGNOSIS — E66.01 OBESITY, CLASS III, BMI 40-49.9 (MORBID OBESITY): ICD-10-CM

## 2025-07-11 RX ORDER — SEMAGLUTIDE 1.7 MG/.75ML
1.7 INJECTION, SOLUTION SUBCUTANEOUS
Qty: 3 ML | Refills: 4 | Status: ACTIVE | OUTPATIENT
Start: 2025-07-11

## 2025-07-11 NOTE — TELEPHONE ENCOUNTER
Pt has reached weight goal and would like dose reduction to avoid additional weight loss. Orders sent for Wegovy 1.7 mg for maintenance therapy.

## 2025-08-06 ENCOUNTER — PATIENT MESSAGE (OUTPATIENT)
Dept: ADMINISTRATIVE | Facility: OTHER | Age: 40
End: 2025-08-06
Payer: COMMERCIAL